# Patient Record
Sex: FEMALE | Race: OTHER | HISPANIC OR LATINO | URBAN - METROPOLITAN AREA
[De-identification: names, ages, dates, MRNs, and addresses within clinical notes are randomized per-mention and may not be internally consistent; named-entity substitution may affect disease eponyms.]

---

## 2018-02-19 ENCOUNTER — EMERGENCY (EMERGENCY)
Facility: HOSPITAL | Age: 48
LOS: 1 days | Discharge: HOME | End: 2018-02-19
Admitting: EMERGENCY MEDICINE
Payer: COMMERCIAL

## 2018-02-19 VITALS
TEMPERATURE: 98 F | HEART RATE: 72 BPM | DIASTOLIC BLOOD PRESSURE: 57 MMHG | OXYGEN SATURATION: 98 % | SYSTOLIC BLOOD PRESSURE: 103 MMHG | RESPIRATION RATE: 18 BRPM | WEIGHT: 151.68 LBS

## 2018-02-19 PROCEDURE — 99284 EMERGENCY DEPT VISIT MOD MDM: CPT

## 2018-02-19 PROCEDURE — 96372 THER/PROPH/DIAG INJ SC/IM: CPT

## 2018-02-19 PROCEDURE — 99284 EMERGENCY DEPT VISIT MOD MDM: CPT | Mod: 25

## 2018-02-19 RX ORDER — KETOROLAC TROMETHAMINE 30 MG/ML
60 SYRINGE (ML) INJECTION ONCE
Qty: 0 | Refills: 0 | Status: DISCONTINUED | OUTPATIENT
Start: 2018-02-19 | End: 2018-02-19

## 2018-02-19 RX ORDER — DIAZEPAM 5 MG
5 TABLET ORAL ONCE
Qty: 0 | Refills: 0 | Status: DISCONTINUED | OUTPATIENT
Start: 2018-02-19 | End: 2018-02-19

## 2018-02-19 RX ORDER — TRAMADOL HYDROCHLORIDE 50 MG/1
1 TABLET ORAL
Qty: 9 | Refills: 0
Start: 2018-02-19 | End: 2018-02-21

## 2018-02-19 RX ORDER — IBUPROFEN 200 MG
1 TABLET ORAL
Qty: 15 | Refills: 0
Start: 2018-02-19 | End: 2018-02-23

## 2018-02-19 RX ORDER — DIAZEPAM 5 MG
1 TABLET ORAL
Qty: 9 | Refills: 0 | OUTPATIENT
Start: 2018-02-19 | End: 2018-02-21

## 2018-02-19 RX ORDER — TRAMADOL HYDROCHLORIDE 50 MG/1
50 TABLET ORAL ONCE
Qty: 0 | Refills: 0 | Status: DISCONTINUED | OUTPATIENT
Start: 2018-02-19 | End: 2018-02-19

## 2018-02-19 RX ADMIN — Medication 5 MILLIGRAM(S): at 13:18

## 2018-02-19 RX ADMIN — TRAMADOL HYDROCHLORIDE 50 MILLIGRAM(S): 50 TABLET ORAL at 13:18

## 2018-02-19 RX ADMIN — Medication 60 MILLIGRAM(S): at 13:18

## 2018-02-19 NOTE — ED ADULT NURSE NOTE - OBJECTIVE STATEMENT
48 y/o female c/o lower left sided back pain radiating down the left leg since 2 weeks ago but got much worse yesterday. pt reports "I was picking up my daughter two weeks ago and I think I hurt it then." denies bowel or bladder incontinence, sob, n/v/d, fever/chills, urinary symptoms, chest pain, numbness/tingling. NAD, VSS, able to ambulate independently. also c/o slight headache.

## 2018-02-19 NOTE — ED PROVIDER NOTE - MEDICAL DECISION MAKING DETAILS
pt w/l lbp radiating down l leg x 2 wks after heavy lifting, no blunt trauma, no signs of cord compression or cauda equina, exam consistent w/sciatica, will tx w/nsaids and muscle relaxants, to f/u w/spine for outpatient mri and further tx, pt understands and agrees w/plan

## 2018-02-19 NOTE — ED ADULT NURSE NOTE - CHPI ED SYMPTOMS NEG
no numbness/no tingling/no anorexia/no constipation/no bowel dysfunction/no motor function loss/no difficulty bearing weight/no fatigue/no neck tenderness/no bladder dysfunction

## 2018-02-19 NOTE — ED PROVIDER NOTE - MUSCULOSKELETAL, MLM
no spinal tend, no rash, no lesions, FROM, + tend over L sciatic notch, + straight leg raise on L, muscle strength 5/5 b/l LE, good resistance b/l, + light touch b/l, normal gait

## 2018-02-19 NOTE — ED ADULT TRIAGE NOTE - CHIEF COMPLAINT QUOTE
Pt CO lower back pain radiating down LLE.  Pt states "I picked up my daughter and that's when the pain started."  Pt denies falls, trauma, dizziness, N/V/D, SOB, Fevers and CP

## 2018-02-19 NOTE — ED PROVIDER NOTE - OBJECTIVE STATEMENT
The pt is a 48 y/o F, who presents to ED L sided back pain x 2 wks. States pain started after picking up her daughter, pain is 10/10, constant and dull, radiates down L leg, has not taken any pain meds today - took ibuprofen once yest. Denies blunt trauma, fall, numbness or tingling to toes, loss of bowel or bladder control, fevers, chills, flank pain

## 2018-02-23 DIAGNOSIS — M54.5 LOW BACK PAIN: ICD-10-CM

## 2018-02-23 DIAGNOSIS — M54.42 LUMBAGO WITH SCIATICA, LEFT SIDE: ICD-10-CM

## 2019-08-10 ENCOUNTER — EMERGENCY (EMERGENCY)
Facility: HOSPITAL | Age: 49
LOS: 1 days | Discharge: HOME | End: 2019-08-10
Admitting: EMERGENCY MEDICINE
Payer: MEDICAID

## 2019-08-10 VITALS
HEART RATE: 75 BPM | SYSTOLIC BLOOD PRESSURE: 102 MMHG | WEIGHT: 151.9 LBS | HEIGHT: 62 IN | DIASTOLIC BLOOD PRESSURE: 64 MMHG | OXYGEN SATURATION: 97 % | TEMPERATURE: 98 F | RESPIRATION RATE: 20 BRPM

## 2019-08-10 DIAGNOSIS — K08.89 OTHER SPECIFIED DISORDERS OF TEETH AND SUPPORTING STRUCTURES: ICD-10-CM

## 2019-08-10 DIAGNOSIS — R68.84 JAW PAIN: ICD-10-CM

## 2019-08-10 PROCEDURE — 99283 EMERGENCY DEPT VISIT LOW MDM: CPT

## 2019-08-10 PROCEDURE — 99284 EMERGENCY DEPT VISIT MOD MDM: CPT

## 2019-08-10 RX ORDER — AMOXICILLIN 250 MG/5ML
500 SUSPENSION, RECONSTITUTED, ORAL (ML) ORAL ONCE
Refills: 0 | Status: COMPLETED | OUTPATIENT
Start: 2019-08-10 | End: 2019-08-10

## 2019-08-10 RX ORDER — AMOXICILLIN 250 MG/5ML
1 SUSPENSION, RECONSTITUTED, ORAL (ML) ORAL
Qty: 30 | Refills: 0
Start: 2019-08-10 | End: 2019-08-19

## 2019-08-10 RX ORDER — IBUPROFEN 200 MG
1 TABLET ORAL
Qty: 30 | Refills: 0
Start: 2019-08-10 | End: 2019-08-19

## 2019-08-10 RX ORDER — OXYCODONE AND ACETAMINOPHEN 5; 325 MG/1; MG/1
1 TABLET ORAL ONCE
Refills: 0 | Status: DISCONTINUED | OUTPATIENT
Start: 2019-08-10 | End: 2019-08-10

## 2019-08-10 RX ADMIN — OXYCODONE AND ACETAMINOPHEN 1 TABLET(S): 5; 325 TABLET ORAL at 19:40

## 2019-08-10 RX ADMIN — Medication 500 MILLIGRAM(S): at 19:40

## 2019-08-10 NOTE — ED PROVIDER NOTE - PHYSICAL EXAMINATION
Tenderness to tooth number 12. No swelling. No purulent discharge. Tooth is intact. Tenderness to tooth number 12. No purulent discharge. Tooth is intact. no gum swelling or facial swelling.  no erythema.

## 2019-08-10 NOTE — ED ADULT NURSE NOTE - OBJECTIVE STATEMENT
Pt presents to ED with c/o L side jaw pain x3 days, states she has a tooth infection, no fever/chills or facial swelling noted.

## 2019-08-10 NOTE — ED PROVIDER NOTE - OBJECTIVE STATEMENT
49 y/o F with no significant PMHx presents to the ED with complaints of L upper tooth pain x 3 days. Patient reports pain with brushing teeth and eating. Patient states she last had unremarkable dental visit one month ago. Scheduled for follow up appointment in 4 days. Has been taking Motrin for pain, most recently at 08:00 this morning. Denies trauma, fever, swelling, drainage or any other acute complaints.

## 2019-08-10 NOTE — ED PROVIDER NOTE - CLINICAL SUMMARY MEDICAL DECISION MAKING FREE TEXT BOX
right upper toothache. pt well appearing. a febrile. no evidence of abscess on exam. will tx with pain meds, amoxicillin and f/u with dental

## 2019-08-10 NOTE — ED ADULT TRIAGE NOTE - CHIEF COMPLAINT QUOTE
Patient complaining of left sided jaw pain for three days.  Patient denies any difficulty breathing, N/V, fevers or any other complaints at this time.

## 2019-08-10 NOTE — ED PROVIDER NOTE - NSFOLLOWUPINSTRUCTIONS_ED_ALL_ED_FT
Follow up with your dentist on 8/12/19          Toothache    WHAT YOU NEED TO KNOW:    A toothache is pain that is caused by irritation of the nerves in the center of your tooth. The irritation may be caused by several problems, such as a cavity, an infection, a cracked tooth, or gum disease. Tooth Anatomy         DISCHARGE INSTRUCTIONS:    Return to the emergency department if:     You have trouble breathing or swallowing.       You have swelling in your face or neck.     Contact your dentist if:     You have a fever and chills.       You have trouble opening or closing your mouth.       You have swelling around your tooth.       You have questions or concerns about your condition or care.    Medicines: You may need any of the following:     NSAIDs, such as ibuprofen, help decrease swelling, pain, and fever. This medicine is available with or without a doctor's order. NSAIDs can cause stomach bleeding or kidney problems in certain people. If you take blood thinner medicine, always ask if NSAIDs are safe for you. Always read the medicine label and follow directions. Do not give these medicines to children under 6 months of age without direction from your child's healthcare provider.      Acetaminophen decreases pain and fever. It is available without a doctor's order. Ask how much to take and how often to take it. Follow directions. Acetaminophen can cause liver damage if not taken correctly.      Prescription pain medicine may be given. Ask your healthcare provider how to take this medicine safely. Some prescription pain medicines contain acetaminophen. Do not take other medicines that contain acetaminophen without talking to your healthcare provider. Too much acetaminophen may cause liver damage. Prescription pain medicine may cause constipation. Ask your healthcare provider how to prevent or treat constipation.       Antibiotics help treat or prevent a bacterial infection.       Take your medicine as directed. Contact your healthcare provider if you think your medicine is not helping or if you have side effects. Tell him of her if you are allergic to any medicine. Keep a list of the medicines, vitamins, and herbs you take. Include the amounts, and when and why you take them. Bring the list or the pill bottles to follow-up visits. Carry your medicine list with you in case of an emergency.    Self-care:     Rinse your mouth with warm salt water 4 times a day or as directed.       Eat soft foods to help relieve pain caused by chewing.       Apply ice on your jaw or cheek for 15 to 20 minutes every hour or as directed. Use an ice pack, or put crushed ice in a plastic bag. Cover it with a towel before you apply it. Ice helps prevent tissue damage and decreases swelling and pain.    Help prevent a toothache:     Brush your teeth at least 2 times a day.      Use dental floss to clean between your teeth at least 1 time a day.      See your dentist regularly every 6 months for dental cleanings and oral exams.    Follow up with your dentist as directed: You may be referred to a dental surgeon. Write down your questions so you remember to ask them during your visits.        © Copyright [employer] 2019 All illustrations and images included in CareNotes are the copyrighted property of [employer] or [employer].      back to top                      © Copyright [employer] 2019

## 2019-12-10 NOTE — ED ADULT NURSE NOTE - NSFALLRSKHARMRISK_ED_ALL_ED
Frontal HA, high blood pressure. Sent in by PMD for further assessment    Was evaluated  12/8 in the ED     I performed a brief evaluation, including history and physical, of the patient here in triage and I have determined that pt will need further treatment and evaluation from the main side ER physician. I have placed initial orders to help in expediting patients care.      December 10, 2019 at 4:53 PM - Frankey Berry, PA
no

## 2020-01-21 ENCOUNTER — EMERGENCY (EMERGENCY)
Facility: HOSPITAL | Age: 50
LOS: 1 days | Discharge: HOME | End: 2020-01-21
Attending: EMERGENCY MEDICINE | Admitting: EMERGENCY MEDICINE
Payer: MEDICAID

## 2020-01-21 VITALS
TEMPERATURE: 98 F | OXYGEN SATURATION: 99 % | DIASTOLIC BLOOD PRESSURE: 57 MMHG | HEART RATE: 67 BPM | WEIGHT: 147.05 LBS | RESPIRATION RATE: 20 BRPM | SYSTOLIC BLOOD PRESSURE: 123 MMHG

## 2020-01-21 LAB
ALBUMIN SERPL ELPH-MCNC: 3.9 G/DL — SIGNIFICANT CHANGE UP (ref 3.3–5)
ALP SERPL-CCNC: 167 U/L — HIGH (ref 40–120)
ALT FLD-CCNC: 173 U/L — HIGH (ref 10–45)
ANION GAP SERPL CALC-SCNC: 11 MMOL/L — SIGNIFICANT CHANGE UP (ref 5–17)
AST SERPL-CCNC: 222 U/L — HIGH (ref 10–40)
BASOPHILS # BLD AUTO: 0.04 K/UL — SIGNIFICANT CHANGE UP (ref 0–0.2)
BASOPHILS NFR BLD AUTO: 0.5 % — SIGNIFICANT CHANGE UP (ref 0–2)
BILIRUB SERPL-MCNC: 1 MG/DL — SIGNIFICANT CHANGE UP (ref 0.2–1.2)
BUN SERPL-MCNC: 9 MG/DL — SIGNIFICANT CHANGE UP (ref 7–23)
CALCIUM SERPL-MCNC: 8.2 MG/DL — LOW (ref 8.4–10.5)
CHLORIDE SERPL-SCNC: 104 MMOL/L — SIGNIFICANT CHANGE UP (ref 96–108)
CO2 SERPL-SCNC: 24 MMOL/L — SIGNIFICANT CHANGE UP (ref 22–31)
CREAT SERPL-MCNC: 0.62 MG/DL — SIGNIFICANT CHANGE UP (ref 0.5–1.3)
EOSINOPHIL # BLD AUTO: 0.08 K/UL — SIGNIFICANT CHANGE UP (ref 0–0.5)
EOSINOPHIL NFR BLD AUTO: 0.9 % — SIGNIFICANT CHANGE UP (ref 0–6)
GLUCOSE SERPL-MCNC: 112 MG/DL — HIGH (ref 70–99)
HCG SERPL-ACNC: <0 MIU/ML — SIGNIFICANT CHANGE UP
HCT VFR BLD CALC: 38.3 % — SIGNIFICANT CHANGE UP (ref 34.5–45)
HGB BLD-MCNC: 12 G/DL — SIGNIFICANT CHANGE UP (ref 11.5–15.5)
IMM GRANULOCYTES NFR BLD AUTO: 0.2 % — SIGNIFICANT CHANGE UP (ref 0–1.5)
LACTATE SERPL-SCNC: 0.8 MMOL/L — SIGNIFICANT CHANGE UP (ref 0.5–2)
LIDOCAIN IGE QN: 17 U/L — SIGNIFICANT CHANGE UP (ref 7–60)
LYMPHOCYTES # BLD AUTO: 1.2 K/UL — SIGNIFICANT CHANGE UP (ref 1–3.3)
LYMPHOCYTES # BLD AUTO: 13.8 % — SIGNIFICANT CHANGE UP (ref 13–44)
MCHC RBC-ENTMCNC: 28.8 PG — SIGNIFICANT CHANGE UP (ref 27–34)
MCHC RBC-ENTMCNC: 31.3 GM/DL — LOW (ref 32–36)
MCV RBC AUTO: 91.8 FL — SIGNIFICANT CHANGE UP (ref 80–100)
MONOCYTES # BLD AUTO: 0.73 K/UL — SIGNIFICANT CHANGE UP (ref 0–0.9)
MONOCYTES NFR BLD AUTO: 8.4 % — SIGNIFICANT CHANGE UP (ref 2–14)
NEUTROPHILS # BLD AUTO: 6.63 K/UL — SIGNIFICANT CHANGE UP (ref 1.8–7.4)
NEUTROPHILS NFR BLD AUTO: 76.2 % — SIGNIFICANT CHANGE UP (ref 43–77)
NRBC # BLD: 0 /100 WBCS — SIGNIFICANT CHANGE UP (ref 0–0)
PLATELET # BLD AUTO: 277 K/UL — SIGNIFICANT CHANGE UP (ref 150–400)
POTASSIUM SERPL-MCNC: 3.9 MMOL/L — SIGNIFICANT CHANGE UP (ref 3.5–5.3)
POTASSIUM SERPL-SCNC: 3.9 MMOL/L — SIGNIFICANT CHANGE UP (ref 3.5–5.3)
PROT SERPL-MCNC: 7.2 G/DL — SIGNIFICANT CHANGE UP (ref 6–8.3)
RBC # BLD: 4.17 M/UL — SIGNIFICANT CHANGE UP (ref 3.8–5.2)
RBC # FLD: 12.5 % — SIGNIFICANT CHANGE UP (ref 10.3–14.5)
SODIUM SERPL-SCNC: 139 MMOL/L — SIGNIFICANT CHANGE UP (ref 135–145)
WBC # BLD: 8.7 K/UL — SIGNIFICANT CHANGE UP (ref 3.8–10.5)
WBC # FLD AUTO: 8.7 K/UL — SIGNIFICANT CHANGE UP (ref 3.8–10.5)

## 2020-01-21 PROCEDURE — 93010 ELECTROCARDIOGRAM REPORT: CPT

## 2020-01-21 PROCEDURE — 99285 EMERGENCY DEPT VISIT HI MDM: CPT

## 2020-01-21 RX ORDER — MORPHINE SULFATE 50 MG/1
4 CAPSULE, EXTENDED RELEASE ORAL ONCE
Refills: 0 | Status: DISCONTINUED | OUTPATIENT
Start: 2020-01-21 | End: 2020-01-21

## 2020-01-21 RX ORDER — SODIUM CHLORIDE 9 MG/ML
1000 INJECTION INTRAMUSCULAR; INTRAVENOUS; SUBCUTANEOUS ONCE
Refills: 0 | Status: COMPLETED | OUTPATIENT
Start: 2020-01-21 | End: 2020-01-21

## 2020-01-21 RX ORDER — LIDOCAINE 4 G/100G
5 CREAM TOPICAL ONCE
Refills: 0 | Status: COMPLETED | OUTPATIENT
Start: 2020-01-21 | End: 2020-01-21

## 2020-01-21 RX ADMIN — SODIUM CHLORIDE 1000 MILLILITER(S): 9 INJECTION INTRAMUSCULAR; INTRAVENOUS; SUBCUTANEOUS at 23:43

## 2020-01-21 RX ADMIN — MORPHINE SULFATE 4 MILLIGRAM(S): 50 CAPSULE, EXTENDED RELEASE ORAL at 23:44

## 2020-01-21 RX ADMIN — Medication 30 MILLILITER(S): at 23:44

## 2020-01-21 RX ADMIN — LIDOCAINE 5 MILLILITER(S): 4 CREAM TOPICAL at 23:44

## 2020-01-21 NOTE — ED ADULT NURSE NOTE - OBJECTIVE STATEMENT
PT came to ED complaining of bilateral abd pain starting this morning. Pt went to [facility], had CT which, per pt, was "clear".  Pt states her pain returned and she came to [facility] for evaluation. Pt reports nausea, no vomiting, denies dizziness, diarrhea, melena, fever, chills.  PT is alert and oriented x3.

## 2020-01-21 NOTE — ED ADULT TRIAGE NOTE - NSWEIGHTCALCTOOLDRUG_GEN_A_CORE
** Ronnell aware of new consult.  Please see ronnell consult that was completed on 1/13 for full assessment.    D:  Per record review, pt is on comfort cares at this time.  Discharge recommendation is for pt do discharge with hospice care services.  Referral was sent to [facility] who declined the pt due to her internal pain pump.    I:  Ronnell met with pt and her family with palliative care and physician.  Pt is unsure at this time if she wants comfort care versus restorative care.  She is concerned about making the wrong decision.  Pt's family expressed their thoughts and concerns and told the pt that they think she would do better with comfort cares compared to restorative options due to potential side effects.  Pt's discharge options are limited at this time due to lack of finances to pay for facilities and finding a facility that will accept the pt with her pump.  All discharge options were discussed with the pt and her family.  Pt requested that she be able to speak with her family alone about this decision.  Sw provided the family with a list of LTC facilities as well as a LTC-MA jessy if it is needed.    A/P:  Sw will continue with discharge planning and will be available as needed until discharge.        used

## 2020-01-21 NOTE — ED ADULT NURSE NOTE - CHPI ED NUR SYMPTOMS NEG
no dysuria/no fever/no hematuria/no chills/no vomiting/no burning urination/no diarrhea/no abdominal distension/no blood in stool

## 2020-01-22 LAB
APPEARANCE UR: CLEAR — SIGNIFICANT CHANGE UP
BACTERIA # UR AUTO: PRESENT /HPF
BILIRUB UR-MCNC: ABNORMAL
COLOR SPEC: YELLOW — SIGNIFICANT CHANGE UP
DIFF PNL FLD: NEGATIVE — SIGNIFICANT CHANGE UP
EPI CELLS # UR: SIGNIFICANT CHANGE UP /HPF (ref 0–5)
GLUCOSE UR QL: NEGATIVE — SIGNIFICANT CHANGE UP
KETONES UR-MCNC: >=80 MG/DL
LEUKOCYTE ESTERASE UR-ACNC: NEGATIVE — SIGNIFICANT CHANGE UP
NITRITE UR-MCNC: NEGATIVE — SIGNIFICANT CHANGE UP
PH UR: 7 — SIGNIFICANT CHANGE UP (ref 5–8)
PROT UR-MCNC: ABNORMAL MG/DL
RBC CASTS # UR COMP ASSIST: < 5 /HPF — SIGNIFICANT CHANGE UP
SP GR SPEC: 1.02 — SIGNIFICANT CHANGE UP (ref 1–1.03)
UROBILINOGEN FLD QL: 2 E.U./DL
WBC UR QL: < 5 /HPF — SIGNIFICANT CHANGE UP

## 2020-01-22 PROCEDURE — 81001 URINALYSIS AUTO W/SCOPE: CPT

## 2020-01-22 PROCEDURE — 93005 ELECTROCARDIOGRAM TRACING: CPT

## 2020-01-22 PROCEDURE — 96374 THER/PROPH/DIAG INJ IV PUSH: CPT

## 2020-01-22 PROCEDURE — 83690 ASSAY OF LIPASE: CPT

## 2020-01-22 PROCEDURE — 76705 ECHO EXAM OF ABDOMEN: CPT | Mod: 26

## 2020-01-22 PROCEDURE — 36415 COLL VENOUS BLD VENIPUNCTURE: CPT

## 2020-01-22 PROCEDURE — 99284 EMERGENCY DEPT VISIT MOD MDM: CPT | Mod: 25

## 2020-01-22 PROCEDURE — 80053 COMPREHEN METABOLIC PANEL: CPT

## 2020-01-22 PROCEDURE — 96361 HYDRATE IV INFUSION ADD-ON: CPT

## 2020-01-22 PROCEDURE — 84702 CHORIONIC GONADOTROPIN TEST: CPT

## 2020-01-22 PROCEDURE — 76705 ECHO EXAM OF ABDOMEN: CPT

## 2020-01-22 PROCEDURE — 85025 COMPLETE CBC W/AUTO DIFF WBC: CPT

## 2020-01-22 PROCEDURE — 83605 ASSAY OF LACTIC ACID: CPT

## 2020-01-22 RX ORDER — TRAMADOL HYDROCHLORIDE 50 MG/1
25 TABLET ORAL ONCE
Refills: 0 | Status: DISCONTINUED | OUTPATIENT
Start: 2020-01-22 | End: 2020-01-22

## 2020-01-22 RX ORDER — TRAMADOL HYDROCHLORIDE 50 MG/1
1 TABLET ORAL
Qty: 12 | Refills: 0
Start: 2020-01-22 | End: 2020-01-25

## 2020-01-22 RX ADMIN — SODIUM CHLORIDE 1000 MILLILITER(S): 9 INJECTION INTRAMUSCULAR; INTRAVENOUS; SUBCUTANEOUS at 01:00

## 2020-01-22 RX ADMIN — TRAMADOL HYDROCHLORIDE 25 MILLIGRAM(S): 50 TABLET ORAL at 02:58

## 2020-01-22 RX ADMIN — TRAMADOL HYDROCHLORIDE 25 MILLIGRAM(S): 50 TABLET ORAL at 02:59

## 2020-01-22 RX ADMIN — MORPHINE SULFATE 4 MILLIGRAM(S): 50 CAPSULE, EXTENDED RELEASE ORAL at 00:00

## 2020-01-22 NOTE — ED PROVIDER NOTE - CLINICAL SUMMARY MEDICAL DECISION MAKING FREE TEXT BOX
abd pain- suprapubic ttp- but b9 abd- with neg CT earlier today- will US GB- recheck labs, serial abd exams abd pain- suprapubic ttp- but b9 abd- with neg CT earlier today- will US GB- recheck labs, serial abd exams  US stones- exam- pain is suprapubic- RUQ neg- d/c home- pain meds prn- si/sx to return explained return in 24 hrs if pain not resolved

## 2020-01-22 NOTE — ED PROVIDER NOTE - OBJECTIVE STATEMENT
49 F co abd pain- 1 day of constant sharp abd pain- started after drinking coffee- went to [facility]- had full schmitz- inc CTa chest/abd/pelvis- neg for acute pathology- labs neg- was given morphine and dc'ed on protonix- presents w persistent sharp pain  feels similar to prior kidney stones- no gu sx  no f/c mild nausea- no vomiting  nml BM this am  no exac/allev factors  moderate severity

## 2020-01-22 NOTE — ED PROVIDER NOTE - NSFOLLOWUPINSTRUCTIONS_ED_ALL_ED_FT
Dolor abdominal en adultos  Abdominal Pain, Adult  El dolor abdominal puede tener muchas causas. A menudo, no es grave y mejora sin tratamiento o con tratamiento en la casa. Sin embargo, a veces el dolor abdominal es intenso. El médico revisará phoebe antecedentes médicos y le hará un examen físico para tratar de determinar la causa del dolor abdominal.  Siga estas instrucciones en bueno casa:  [place] los medicamentos de venta atul y los recetados solamente jonathan se lo haya indicado el médico. No tome un laxante a menos que se lo haya indicado el médico.Irma suficiente líquido para mantener la orina sandie o de color amarillo pálido.Controle bueno afección para oskar si hay cambios.Concurra a todas las visitas de control jonathan se lo haya indicado el médico. [place] es importante.Comuníquese con un médico si:  El dolor abdominal cambia o empeora.No tiene apetito o baja de peso sin proponérselo.Está estreñido o tiene diarrea nahed más de 2 o 3 días.Tiene dolor cuando orina o defeca.El dolor abdominal lo despierta de noche.El dolor empeora con las comidas, después de comer o con determinados alimentos.Tiene vómitos y no puede retener nada.Tiene fiebre.Solicite ayuda de inmediato si:  El dolor no desaparece tan pronto jonathan el médico le dijo que era esperable.No puede detener los vómitos.El dolor se siente solo en zonas del abdomen, jonathan el lado derecho o la parte inferior izquierda del abdomen.Las heces son sanguinolentas o de color belen, o de aspecto alquitranado.Tiene dolor intenso, cólicos, o meteorismo en el abdomen.Tiene signos de deshidratación, por ejemplo:  Orina oscura, muy escasa o falta de orina.Labios agrietados.[place].Ojos hundidos.Somnolencia.Debilidad.Esta información no tiene jonathan fin reemplazar el consejo del médico. Asegúrese de hacerle al médico cualquier pregunta que tenga.    Document Released: 12/18/2006 Document Revised: 12/07/2017 Document Reviewed: 05/31/2017  Elsevier Interactive Patient Education © 2019 [employer] Inc.

## 2020-01-22 NOTE — ED PROVIDER NOTE - PATIENT PORTAL LINK FT
You can access the FollowMyHealth Patient Portal offered by [facility] by registering at the following website: [email or web address]. By joining [facility] [employer]’s FollowMyHealth portal, you will also be able to view your health information using other applications (apps) compatible with our system.

## 2020-01-26 DIAGNOSIS — R11.0 NAUSEA: ICD-10-CM

## 2020-01-26 DIAGNOSIS — R10.9 UNSPECIFIED ABDOMINAL PAIN: ICD-10-CM

## 2020-01-26 DIAGNOSIS — R10.11 RIGHT UPPER QUADRANT PAIN: ICD-10-CM

## 2020-07-27 ENCOUNTER — INPATIENT (INPATIENT)
Facility: HOSPITAL | Age: 50
LOS: 2 days | Discharge: HOME | DRG: 410 | End: 2020-07-30
Attending: SURGERY | Admitting: SURGERY
Payer: MEDICAID

## 2020-07-27 VITALS
RESPIRATION RATE: 18 BRPM | HEIGHT: 62 IN | DIASTOLIC BLOOD PRESSURE: 61 MMHG | SYSTOLIC BLOOD PRESSURE: 108 MMHG | HEART RATE: 72 BPM | WEIGHT: 151.9 LBS | TEMPERATURE: 98 F | OXYGEN SATURATION: 99 %

## 2020-07-27 DIAGNOSIS — Z90.49 ACQUIRED ABSENCE OF OTHER SPECIFIED PARTS OF DIGESTIVE TRACT: Chronic | ICD-10-CM

## 2020-07-27 LAB
ALBUMIN SERPL ELPH-MCNC: 4.2 G/DL — SIGNIFICANT CHANGE UP (ref 3.3–5)
ALP SERPL-CCNC: 136 U/L — HIGH (ref 40–120)
ALT FLD-CCNC: 38 U/L — SIGNIFICANT CHANGE UP (ref 10–45)
ANION GAP SERPL CALC-SCNC: 8 MMOL/L — SIGNIFICANT CHANGE UP (ref 5–17)
APPEARANCE UR: CLEAR — SIGNIFICANT CHANGE UP
APTT BLD: 28.6 SEC — SIGNIFICANT CHANGE UP (ref 27.5–35.5)
AST SERPL-CCNC: 68 U/L — HIGH (ref 10–40)
BASOPHILS # BLD AUTO: 0.03 K/UL — SIGNIFICANT CHANGE UP (ref 0–0.2)
BASOPHILS NFR BLD AUTO: 0.4 % — SIGNIFICANT CHANGE UP (ref 0–2)
BILIRUB SERPL-MCNC: 0.9 MG/DL — SIGNIFICANT CHANGE UP (ref 0.2–1.2)
BILIRUB UR-MCNC: ABNORMAL
BUN SERPL-MCNC: 10 MG/DL — SIGNIFICANT CHANGE UP (ref 7–23)
CALCIUM SERPL-MCNC: 8.7 MG/DL — SIGNIFICANT CHANGE UP (ref 8.4–10.5)
CHLORIDE SERPL-SCNC: 102 MMOL/L — SIGNIFICANT CHANGE UP (ref 96–108)
CO2 SERPL-SCNC: 28 MMOL/L — SIGNIFICANT CHANGE UP (ref 22–31)
COLOR SPEC: YELLOW — SIGNIFICANT CHANGE UP
CREAT SERPL-MCNC: 0.67 MG/DL — SIGNIFICANT CHANGE UP (ref 0.5–1.3)
DIFF PNL FLD: ABNORMAL
EOSINOPHIL # BLD AUTO: 0.05 K/UL — SIGNIFICANT CHANGE UP (ref 0–0.5)
EOSINOPHIL NFR BLD AUTO: 0.7 % — SIGNIFICANT CHANGE UP (ref 0–6)
GLUCOSE SERPL-MCNC: 104 MG/DL — HIGH (ref 70–99)
GLUCOSE UR QL: NEGATIVE — SIGNIFICANT CHANGE UP
HCT VFR BLD CALC: 40.2 % — SIGNIFICANT CHANGE UP (ref 34.5–45)
HGB BLD-MCNC: 12.9 G/DL — SIGNIFICANT CHANGE UP (ref 11.5–15.5)
IMM GRANULOCYTES NFR BLD AUTO: 0.4 % — SIGNIFICANT CHANGE UP (ref 0–1.5)
INR BLD: 1.01 — SIGNIFICANT CHANGE UP (ref 0.88–1.16)
KETONES UR-MCNC: 15 MG/DL
LACTATE SERPL-SCNC: 0.8 MMOL/L — SIGNIFICANT CHANGE UP (ref 0.5–2)
LEUKOCYTE ESTERASE UR-ACNC: NEGATIVE — SIGNIFICANT CHANGE UP
LIDOCAIN IGE QN: 23 U/L — SIGNIFICANT CHANGE UP (ref 7–60)
LYMPHOCYTES # BLD AUTO: 1.12 K/UL — SIGNIFICANT CHANGE UP (ref 1–3.3)
LYMPHOCYTES # BLD AUTO: 15.6 % — SIGNIFICANT CHANGE UP (ref 13–44)
MCHC RBC-ENTMCNC: 30.3 PG — SIGNIFICANT CHANGE UP (ref 27–34)
MCHC RBC-ENTMCNC: 32.1 GM/DL — SIGNIFICANT CHANGE UP (ref 32–36)
MCV RBC AUTO: 94.4 FL — SIGNIFICANT CHANGE UP (ref 80–100)
MONOCYTES # BLD AUTO: 0.63 K/UL — SIGNIFICANT CHANGE UP (ref 0–0.9)
MONOCYTES NFR BLD AUTO: 8.8 % — SIGNIFICANT CHANGE UP (ref 2–14)
NEUTROPHILS # BLD AUTO: 5.34 K/UL — SIGNIFICANT CHANGE UP (ref 1.8–7.4)
NEUTROPHILS NFR BLD AUTO: 74.1 % — SIGNIFICANT CHANGE UP (ref 43–77)
NITRITE UR-MCNC: NEGATIVE — SIGNIFICANT CHANGE UP
NRBC # BLD: 0 /100 WBCS — SIGNIFICANT CHANGE UP (ref 0–0)
PH UR: 6.5 — SIGNIFICANT CHANGE UP (ref 5–8)
PLATELET # BLD AUTO: 230 K/UL — SIGNIFICANT CHANGE UP (ref 150–400)
POTASSIUM SERPL-MCNC: 3.8 MMOL/L — SIGNIFICANT CHANGE UP (ref 3.5–5.3)
POTASSIUM SERPL-SCNC: 3.8 MMOL/L — SIGNIFICANT CHANGE UP (ref 3.5–5.3)
PROT SERPL-MCNC: 7.5 G/DL — SIGNIFICANT CHANGE UP (ref 6–8.3)
PROT UR-MCNC: ABNORMAL MG/DL
PROTHROM AB SERPL-ACNC: 12.1 SEC — SIGNIFICANT CHANGE UP (ref 10.6–13.6)
RBC # BLD: 4.26 M/UL — SIGNIFICANT CHANGE UP (ref 3.8–5.2)
RBC # FLD: 13.3 % — SIGNIFICANT CHANGE UP (ref 10.3–14.5)
SARS-COV-2 RNA SPEC QL NAA+PROBE: SIGNIFICANT CHANGE UP
SODIUM SERPL-SCNC: 138 MMOL/L — SIGNIFICANT CHANGE UP (ref 135–145)
SP GR SPEC: >=1.03 — SIGNIFICANT CHANGE UP (ref 1–1.03)
UROBILINOGEN FLD QL: 2 E.U./DL
WBC # BLD: 7.2 K/UL — SIGNIFICANT CHANGE UP (ref 3.8–10.5)
WBC # FLD AUTO: 7.2 K/UL — SIGNIFICANT CHANGE UP (ref 3.8–10.5)

## 2020-07-27 PROCEDURE — 99285 EMERGENCY DEPT VISIT HI MDM: CPT

## 2020-07-27 PROCEDURE — 76705 ECHO EXAM OF ABDOMEN: CPT | Mod: 26

## 2020-07-27 RX ORDER — SODIUM CHLORIDE 9 MG/ML
1000 INJECTION, SOLUTION INTRAVENOUS
Refills: 0 | Status: DISCONTINUED | OUTPATIENT
Start: 2020-07-27 | End: 2020-07-29

## 2020-07-27 RX ORDER — PANTOPRAZOLE SODIUM 20 MG/1
40 TABLET, DELAYED RELEASE ORAL DAILY
Refills: 0 | Status: DISCONTINUED | OUTPATIENT
Start: 2020-07-27 | End: 2020-07-29

## 2020-07-27 RX ORDER — ONDANSETRON 8 MG/1
4 TABLET, FILM COATED ORAL ONCE
Refills: 0 | Status: COMPLETED | OUTPATIENT
Start: 2020-07-27 | End: 2020-07-27

## 2020-07-27 RX ORDER — HEPARIN SODIUM 5000 [USP'U]/ML
5000 INJECTION INTRAVENOUS; SUBCUTANEOUS EVERY 8 HOURS
Refills: 0 | Status: DISCONTINUED | OUTPATIENT
Start: 2020-07-27 | End: 2020-07-29

## 2020-07-27 RX ORDER — FAMOTIDINE 10 MG/ML
20 INJECTION INTRAVENOUS ONCE
Refills: 0 | Status: COMPLETED | OUTPATIENT
Start: 2020-07-27 | End: 2020-07-27

## 2020-07-27 RX ORDER — METRONIDAZOLE 500 MG
500 TABLET ORAL ONCE
Refills: 0 | Status: COMPLETED | OUTPATIENT
Start: 2020-07-27 | End: 2020-07-27

## 2020-07-27 RX ORDER — CEFTRIAXONE 500 MG/1
1000 INJECTION, POWDER, FOR SOLUTION INTRAMUSCULAR; INTRAVENOUS ONCE
Refills: 0 | Status: COMPLETED | OUTPATIENT
Start: 2020-07-27 | End: 2020-07-27

## 2020-07-27 RX ORDER — ONDANSETRON 8 MG/1
4 TABLET, FILM COATED ORAL EVERY 6 HOURS
Refills: 0 | Status: DISCONTINUED | OUTPATIENT
Start: 2020-07-27 | End: 2020-07-29

## 2020-07-27 RX ORDER — MORPHINE SULFATE 50 MG/1
4 CAPSULE, EXTENDED RELEASE ORAL ONCE
Refills: 0 | Status: DISCONTINUED | OUTPATIENT
Start: 2020-07-27 | End: 2020-07-27

## 2020-07-27 RX ADMIN — FAMOTIDINE 20 MILLIGRAM(S): 10 INJECTION INTRAVENOUS at 16:41

## 2020-07-27 RX ADMIN — MORPHINE SULFATE 4 MILLIGRAM(S): 50 CAPSULE, EXTENDED RELEASE ORAL at 18:03

## 2020-07-27 RX ADMIN — ONDANSETRON 4 MILLIGRAM(S): 8 TABLET, FILM COATED ORAL at 16:41

## 2020-07-27 RX ADMIN — Medication 100 MILLIGRAM(S): at 19:37

## 2020-07-27 RX ADMIN — SODIUM CHLORIDE 110 MILLILITER(S): 9 INJECTION, SOLUTION INTRAVENOUS at 22:56

## 2020-07-27 RX ADMIN — CEFTRIAXONE 100 MILLIGRAM(S): 500 INJECTION, POWDER, FOR SOLUTION INTRAMUSCULAR; INTRAVENOUS at 18:20

## 2020-07-27 NOTE — ED PROVIDER NOTE - ATTENDING CONTRIBUTION TO CARE
49F PMH cholelithiasis p/w 1-2d of RUQ/epigastric pain, radiating to back. No other systemic symptoms. Vitals wnl, exam as above.  US ~7 mos ago w/ cholelithiasis, mild transaminitis at that time.   ddx: cholecystitis/cholelithiasis vs. GERD/gastritis/PUD vs. pancreatitis.   IVF, symptom control, labs, RUQ US, reassess.

## 2020-07-27 NOTE — ED PROVIDER NOTE - CLINICAL SUMMARY MEDICAL DECISION MAKING FREE TEXT BOX
Patient admitted for further surgical intervention. Labs wnl and pain better control. Sonogram + for ac cholecystitis

## 2020-07-27 NOTE — ED ADULT NURSE NOTE - NS ED NOTE ABUSE RESPONSE YN
Yes Birth Control Pills Pregnancy And Lactation Text: This medication should be avoided if pregnant and for the first 30 days post-partum.

## 2020-07-27 NOTE — ED ADULT NURSE NOTE - CHPI ED NUR SYMPTOMS NEG
no hematuria/no diarrhea/no burning urination/no blood in stool/no fever/no abdominal distension/no chills

## 2020-07-27 NOTE — ED PROVIDER NOTE - OBJECTIVE STATEMENT
47 y/o 48 y/o F PMHx gastritis presents to the ED with c/o severe abdominal pain x 2 D w/ associated nausea. Admits to drinking alcohol 2 D ago. Denies vomiting, fever, diarrhea, urinary sx. Also reports being treated for syphilis. Pt had first dose of antibiotics on Tuesday.

## 2020-07-27 NOTE — H&P ADULT - HISTORY OF PRESENT ILLNESS
48 y/o F pmh Gastritis, PSH Appendectomy p/w 1 day of abdominal pain. Patient states pain began yesterday as dull, intermittent, epigastric pain radiating to the back. Pain worsened by meals, no alleviating factors. Associated with nausea and nbnb emesis. Passing flatus and having BMs. Denies fevers, chills, cp, sob, constipation.     In the ED vitals were as follows: 98.4 HR 72 /61 sat 99%. Received 1 dose of Ceftriaxone 1g and Flagyl 500mg.

## 2020-07-27 NOTE — H&P ADULT - NSHPLABSRESULTS_GEN_ALL_CORE
LABS:                        12.9   7.20  )-----------( 230      ( 27 Jul 2020 16:08 )             40.2     07-27    138  |  102  |  10  ----------------------------<  104<H>  3.8   |  28  |  0.67    Ca    8.7      27 Jul 2020 16:09    TPro  7.5  /  Alb  4.2  /  TBili  0.9  /  DBili  x   /  AST  68<H>  /  ALT  38  /  AlkPhos  136<H>  07-27    PT/INR - ( 27 Jul 2020 18:06 )   PT: 12.1 sec;   INR: 1.01          PTT - ( 27 Jul 2020 18:06 )  PTT:28.6 sec  Urinalysis Basic - ( 27 Jul 2020 18:18 )    Color: Yellow / Appearance: Clear / SG: >=1.030 / pH: x  Gluc: x / Ketone: 15 mg/dL  / Bili: Small / Urobili: 2.0 E.U./dL   Blood: x / Protein: Trace mg/dL / Nitrite: NEGATIVE   Leuk Esterase: NEGATIVE / RBC: < 5 /HPF / WBC < 5 /HPF   Sq Epi: x / Non Sq Epi: 0-5 /HPF / Bacteria: Present /HPF        RADIOLOGY & ADDITIONAL STUDIES:  < from: US Abdomen Limited (07.27.20 @ 17:11) >    FINDINGS:    Liver: Normal echogenicity with no visible focal abnormality. Liver size is normal, measuring 12.4 cm in craniocaudal dimension. Hepatopetal flow is noted in the main portal vein.    Intrahepatic ducts: Not dilated.    Common bile duct: Dilated, measuring 0.8 cm near dillon hepatis and 1.3 cm intrapancreatic portion.    Gallbladder: The gallbladder is filled with shadowing gallstones, which limits evaluation. Positive sonographic Tabor's sign. No pericholecystic fluid seen.    Pancreas: Mildly dilated main pancreatic duct, 0.4 cm.    Abdominal aorta: The visualized portions were unremarkable.    Inferior vena cava: The visualized portions were normal in appearance.    Right kidney: Normal echogenicity. Lower pole cyst measuring 1.9 x 1.6 x 1.6 cm. Length of 10.3 cm.    Ascites: None.      IMPRESSION:  1.  Multiple stones filling the gallbladder lumen with positive sonographic Tabor's sign suspicious for acute cholecystitis.  2.  Dilated extrahepatic bile duct 1.3 cm without sonographic evidence of choledocholithiasis. No intrahepatic duct dilatation. MRI/MRCP might be considered for further assessment.          Thank you for the opportunity to participate in the care of this patient.    < end of copied text >

## 2020-07-27 NOTE — H&P ADULT - ATTENDING COMMENTS
Patient seen and examined.  Minimal tenderness in RUQ.  Mild leukocytosis and LFT abnormalities.  USG with gallstones, dilated CBD especially in distal portion.    A/P: Choledocholithiasis vs choledochal cyst  1. Admit, NPO, IVF  2. MRCP  3. Hold off on antibiotics for now.

## 2020-07-27 NOTE — H&P ADULT - NSHPPHYSICALEXAM_GEN_ALL_CORE
ICU Vital Signs Last 24 Hrs  T(C): 36.9 (27 Jul 2020 21:50), Max: 36.9 (27 Jul 2020 15:28)  T(F): 98.5 (27 Jul 2020 21:50), Max: 98.5 (27 Jul 2020 21:50)  HR: 60 (27 Jul 2020 21:50) (60 - 72)  BP: 99/51 (27 Jul 2020 21:50) (96/52 - 108/61)  BP(mean): --  ABP: --  ABP(mean): --  RR: 16 (27 Jul 2020 21:50) (16 - 18)  SpO2: 99% (27 Jul 2020 21:50) (98% - 99%)    Physical Exam  General: NAD, resting comfortably in bed  C/V: NSR  Pulm: Nonlabored breathing, no respiratory distress  Abd: soft, non-distended, mild epigastric tenderness, no rebound or guarding   Extrem: WWP, no edema,  Neuro: A/O x 3, CNs II-XII grossly intact, no focal deficits, normal sensation  Pulses: palpable distal pulses

## 2020-07-27 NOTE — ED PROVIDER NOTE - PROGRESS NOTE DETAILS
[place]: labs notable for mild transaminitis, improved from prior. US w/ multiple gallstones, +sonographic murphys. Pt still in pain and epigastric/RUQ ttp. Surg consulted. Will reassess. [place]: pain now much improved but still in pain. Minimal epigastric ttp, improved from prior. Awaiting surg consult, likely dispo per surgery. pt for admission to surgery, regional under Dr. Blount

## 2020-07-27 NOTE — ED ADULT NURSE NOTE - SCORE
Last annual: 01/09/2018  Last refill: 4/11/19  Requesting refill with 90 day supply.     Please advise.    2

## 2020-07-28 ENCOUNTER — TRANSCRIPTION ENCOUNTER (OUTPATIENT)
Age: 50
End: 2020-07-28

## 2020-07-28 DIAGNOSIS — E83.39 OTHER DISORDERS OF PHOSPHORUS METABOLISM: ICD-10-CM

## 2020-07-28 DIAGNOSIS — K81.0 ACUTE CHOLECYSTITIS: ICD-10-CM

## 2020-07-28 DIAGNOSIS — K29.70 GASTRITIS, UNSPECIFIED, WITHOUT BLEEDING: ICD-10-CM

## 2020-07-28 LAB
ALBUMIN SERPL ELPH-MCNC: 3.8 G/DL — SIGNIFICANT CHANGE UP (ref 3.3–5)
ALP SERPL-CCNC: 170 U/L — HIGH (ref 40–120)
ALT FLD-CCNC: 273 U/L — HIGH (ref 10–45)
ANION GAP SERPL CALC-SCNC: 9 MMOL/L — SIGNIFICANT CHANGE UP (ref 5–17)
AST SERPL-CCNC: 377 U/L — HIGH (ref 10–40)
BILIRUB SERPL-MCNC: 1.2 MG/DL — SIGNIFICANT CHANGE UP (ref 0.2–1.2)
BUN SERPL-MCNC: 9 MG/DL — SIGNIFICANT CHANGE UP (ref 7–23)
CALCIUM SERPL-MCNC: 8.2 MG/DL — LOW (ref 8.4–10.5)
CHLORIDE SERPL-SCNC: 104 MMOL/L — SIGNIFICANT CHANGE UP (ref 96–108)
CO2 SERPL-SCNC: 24 MMOL/L — SIGNIFICANT CHANGE UP (ref 22–31)
CREAT SERPL-MCNC: 0.63 MG/DL — SIGNIFICANT CHANGE UP (ref 0.5–1.3)
GLUCOSE SERPL-MCNC: 111 MG/DL — HIGH (ref 70–99)
HCT VFR BLD CALC: 37.8 % — SIGNIFICANT CHANGE UP (ref 34.5–45)
HGB BLD-MCNC: 12.2 G/DL — SIGNIFICANT CHANGE UP (ref 11.5–15.5)
MAGNESIUM SERPL-MCNC: 2 MG/DL — SIGNIFICANT CHANGE UP (ref 1.6–2.6)
MCHC RBC-ENTMCNC: 30 PG — SIGNIFICANT CHANGE UP (ref 27–34)
MCHC RBC-ENTMCNC: 32.3 GM/DL — SIGNIFICANT CHANGE UP (ref 32–36)
MCV RBC AUTO: 93.1 FL — SIGNIFICANT CHANGE UP (ref 80–100)
NRBC # BLD: 0 /100 WBCS — SIGNIFICANT CHANGE UP (ref 0–0)
PHOSPHATE SERPL-MCNC: 1.6 MG/DL — LOW (ref 2.5–4.5)
PLATELET # BLD AUTO: 202 K/UL — SIGNIFICANT CHANGE UP (ref 150–400)
POTASSIUM SERPL-MCNC: 3.6 MMOL/L — SIGNIFICANT CHANGE UP (ref 3.5–5.3)
POTASSIUM SERPL-SCNC: 3.6 MMOL/L — SIGNIFICANT CHANGE UP (ref 3.5–5.3)
PROT SERPL-MCNC: 6.3 G/DL — SIGNIFICANT CHANGE UP (ref 6–8.3)
RBC # BLD: 4.06 M/UL — SIGNIFICANT CHANGE UP (ref 3.8–5.2)
RBC # FLD: 13.2 % — SIGNIFICANT CHANGE UP (ref 10.3–14.5)
SODIUM SERPL-SCNC: 137 MMOL/L — SIGNIFICANT CHANGE UP (ref 135–145)
WBC # BLD: 5.9 K/UL — SIGNIFICANT CHANGE UP (ref 3.8–10.5)
WBC # FLD AUTO: 5.9 K/UL — SIGNIFICANT CHANGE UP (ref 3.8–10.5)

## 2020-07-28 PROCEDURE — 43274 ERCP DUCT STENT PLACEMENT: CPT | Mod: GC

## 2020-07-28 PROCEDURE — 43262 ENDO CHOLANGIOPANCREATOGRAPH: CPT | Mod: 59

## 2020-07-28 PROCEDURE — 74328 X-RAY BILE DUCT ENDOSCOPY: CPT | Mod: 26,GC

## 2020-07-28 PROCEDURE — 99223 1ST HOSP IP/OBS HIGH 75: CPT | Mod: 25

## 2020-07-28 PROCEDURE — 99223 1ST HOSP IP/OBS HIGH 75: CPT

## 2020-07-28 PROCEDURE — 43264 ERCP REMOVE DUCT CALCULI: CPT | Mod: GC

## 2020-07-28 PROCEDURE — 71045 X-RAY EXAM CHEST 1 VIEW: CPT | Mod: 26

## 2020-07-28 PROCEDURE — 74183 MRI ABD W/O CNTR FLWD CNTR: CPT | Mod: 26

## 2020-07-28 RX ORDER — POTASSIUM PHOSPHATE, MONOBASIC POTASSIUM PHOSPHATE, DIBASIC 236; 224 MG/ML; MG/ML
30 INJECTION, SOLUTION INTRAVENOUS ONCE
Refills: 0 | Status: COMPLETED | OUTPATIENT
Start: 2020-07-28 | End: 2020-07-28

## 2020-07-28 RX ORDER — ACETAMINOPHEN 500 MG
1000 TABLET ORAL ONCE
Refills: 0 | Status: COMPLETED | OUTPATIENT
Start: 2020-07-28 | End: 2020-07-28

## 2020-07-28 RX ORDER — HYDROMORPHONE HYDROCHLORIDE 2 MG/ML
0.25 INJECTION INTRAMUSCULAR; INTRAVENOUS; SUBCUTANEOUS ONCE
Refills: 0 | Status: DISCONTINUED | OUTPATIENT
Start: 2020-07-28 | End: 2020-07-28

## 2020-07-28 RX ORDER — HYDROMORPHONE HYDROCHLORIDE 2 MG/ML
0.5 INJECTION INTRAMUSCULAR; INTRAVENOUS; SUBCUTANEOUS ONCE
Refills: 0 | Status: DISCONTINUED | OUTPATIENT
Start: 2020-07-28 | End: 2020-07-28

## 2020-07-28 RX ORDER — POTASSIUM PHOSPHATE, MONOBASIC POTASSIUM PHOSPHATE, DIBASIC 236; 224 MG/ML; MG/ML
21 INJECTION, SOLUTION INTRAVENOUS ONCE
Refills: 0 | Status: DISCONTINUED | OUTPATIENT
Start: 2020-07-28 | End: 2020-07-28

## 2020-07-28 RX ADMIN — HYDROMORPHONE HYDROCHLORIDE 0.25 MILLIGRAM(S): 2 INJECTION INTRAMUSCULAR; INTRAVENOUS; SUBCUTANEOUS at 05:40

## 2020-07-28 RX ADMIN — POTASSIUM PHOSPHATE, MONOBASIC POTASSIUM PHOSPHATE, DIBASIC 83.33 MILLIMOLE(S): 236; 224 INJECTION, SOLUTION INTRAVENOUS at 10:15

## 2020-07-28 RX ADMIN — HEPARIN SODIUM 5000 UNIT(S): 5000 INJECTION INTRAVENOUS; SUBCUTANEOUS at 05:28

## 2020-07-28 RX ADMIN — HYDROMORPHONE HYDROCHLORIDE 0.25 MILLIGRAM(S): 2 INJECTION INTRAMUSCULAR; INTRAVENOUS; SUBCUTANEOUS at 08:58

## 2020-07-28 RX ADMIN — Medication 400 MILLIGRAM(S): at 05:07

## 2020-07-28 RX ADMIN — PANTOPRAZOLE SODIUM 40 MILLIGRAM(S): 20 TABLET, DELAYED RELEASE ORAL at 13:36

## 2020-07-28 RX ADMIN — HYDROMORPHONE HYDROCHLORIDE 0.25 MILLIGRAM(S): 2 INJECTION INTRAMUSCULAR; INTRAVENOUS; SUBCUTANEOUS at 05:25

## 2020-07-28 RX ADMIN — ONDANSETRON 4 MILLIGRAM(S): 8 TABLET, FILM COATED ORAL at 09:02

## 2020-07-28 RX ADMIN — HEPARIN SODIUM 5000 UNIT(S): 5000 INJECTION INTRAVENOUS; SUBCUTANEOUS at 21:03

## 2020-07-28 RX ADMIN — HYDROMORPHONE HYDROCHLORIDE 0.25 MILLIGRAM(S): 2 INJECTION INTRAMUSCULAR; INTRAVENOUS; SUBCUTANEOUS at 09:14

## 2020-07-28 RX ADMIN — HEPARIN SODIUM 5000 UNIT(S): 5000 INJECTION INTRAVENOUS; SUBCUTANEOUS at 13:36

## 2020-07-28 RX ADMIN — Medication 1000 MILLIGRAM(S): at 05:40

## 2020-07-28 NOTE — CHART NOTE - NSCHARTNOTEFT_GEN_A_CORE
Patient went for ERCP today. Had gone to MRCP prior to EUS. Images reviewed with filling defect seen in the distal CBD. ERCP with sphincterotomy, removal of common bile duct stone, placement of plastic double pigtail stent.     RECOMMENDATIONS  Clear liquid diet tonight  Continue aggressive fluid resuscitation with LR  Trend LTs  Proceed to cholecystectomy per surgical recommendations  Repeat ERCP in 6-8 weeks for stent removal

## 2020-07-28 NOTE — CONSULT NOTE ADULT - ATTENDING COMMENTS
Pt with epigastric pain, elevated LFTs and stone on MRCP images.    Plan was for ERCP which demonstrated a bile duct stone that was extracted with sphincterotomy and balloon sweep, as well as stent placement.

## 2020-07-28 NOTE — CONSULT NOTE ADULT - ASSESSMENT
49 F pmh Gastritis, PSH Appendectomy p/w 1 day of abdominal pain w/ possible acute cholecystitis vs choledocholithiasis vs stricture

## 2020-07-28 NOTE — CONSULT NOTE ADULT - SUBJECTIVE AND OBJECTIVE BOX
Patient is a 49y old  Female who presents with a chief complaint of Acute Cholecystitis (28 Jul 2020 13:25)      HPI:  48 y/o F pmh Gastritis, PSH Appendectomy p/w 1 day of abdominal pain. Patient states pain began yesterday as dull, intermittent, epigastric pain radiating to the back. Pain worsened by meals, no alleviating factors. Associated with nausea and nbnb emesis. Passing flatus and having BMs. Denies fevers, chills, cp, sob, constipation.     Subjective:  Pt reports continued pain in epigastric region, although pain medication has helped. Reports N, denies vj vomiting ROS is otherwise negative.       Allergies    No Known Allergies    Intolerances    Home meds: none    MEDICATIONS  (STANDING):  heparin   Injectable 5000 Unit(s) SubCutaneous every 8 hours  lactated ringers. 1000 milliLiter(s) (110 mL/Hr) IV Continuous <Continuous>  pantoprazole  Injectable 40 milliGRAM(s) IV Push daily    MEDICATIONS  (PRN):  ondansetron Injectable 4 milliGRAM(s) IV Push every 6 hours PRN Nausea      Drug Dosing Weight  Height (cm): 157.48 (28 Jul 2020 16:04)  Weight (kg): 68.9 (28 Jul 2020 01:41)  BMI (kg/m2): 27.8 (28 Jul 2020 16:04)  BSA (m2): 1.7 (28 Jul 2020 16:04)    PAST MEDICAL & SURGICAL HISTORY:  Gastritis  No pertinent past medical history  S/P appendectomy      FAMILY HISTORY:  no cardiac disease    SOCIAL HISTORY:  no smoking, no EtOH use, no drug use    ADVANCE DIRECTIVES:    Vital Signs Last 24 Hrs  T(C): 36.7 (28 Jul 2020 15:58), Max: 36.9 (27 Jul 2020 21:50)  T(F): 98.1 (28 Jul 2020 15:58), Max: 98.5 (27 Jul 2020 21:50)  HR: 57 (28 Jul 2020 15:58) (55 - 75)  BP: 113/69 (28 Jul 2020 15:58) (93/57 - 115/62)  BP(mean): --  ABP: --  ABP(mean): --  RR: 17 (28 Jul 2020 15:58) (16 - 18)  SpO2: 98% (28 Jul 2020 15:58) (96% - 99%)          I&O's Detail    27 Jul 2020 07:01  -  28 Jul 2020 07:00  --------------------------------------------------------  IN:    lactated ringers.: 880 mL  Total IN: 880 mL    OUT:    Voided: 650 mL  Total OUT: 650 mL    Total NET: 230 mL          PHYSICAL EXAM:      Constitutional: NAD  Eyes: PERRLA  ENMT: MMM  Neck: supple  Back: midline  Respiratory: CTA b/l  Cardiovascular: rrr, s1s2, no m/r/g  Gastrointestinal: TTP in epigastric region, soft, non distended, + BS  Extremities: wwp  Vascular: + 2 pulses DP/TP  Neurological: AAO x 4  Skin: no rash  Lymph Nodes: no LAD  Musculoskeletal: no joint swelling  Psychiatric: normal affect    LABS:  CBC Full  -  ( 28 Jul 2020 07:10 )  WBC Count : 5.90 K/uL  RBC Count : 4.06 M/uL  Hemoglobin : 12.2 g/dL  Hematocrit : 37.8 %  Platelet Count - Automated : 202 K/uL  Mean Cell Volume : 93.1 fl  Mean Cell Hemoglobin : 30.0 pg  Mean Cell Hemoglobin Concentration : 32.3 gm/dL  Auto Neutrophil # : x  Auto Lymphocyte # : x  Auto Monocyte # : x  Auto Eosinophil # : x  Auto Basophil # : x  Auto Neutrophil % : x  Auto Lymphocyte % : x  Auto Monocyte % : x  Auto Eosinophil % : x  Auto Basophil % : x    07-28    137  |  104  |  9   ----------------------------<  111<H>  3.6   |  24  |  0.63    Ca    8.2<L>      28 Jul 2020 06:20  Phos  1.6     07-28  Mg     2.0     07-28    TPro  6.3  /  Alb  3.8  /  TBili  1.2  /  DBili  x   /  AST  377<H>  /  ALT  273<H>  /  AlkPhos  170<H>  07-28    CAPILLARY BLOOD GLUCOSE        PT/INR - ( 27 Jul 2020 18:06 )   PT: 12.1 sec;   INR: 1.01          PTT - ( 27 Jul 2020 18:06 )  PTT:28.6 sec  Urinalysis Basic - ( 27 Jul 2020 18:18 )    Color: Yellow / Appearance: Clear / SG: >=1.030 / pH: x  Gluc: x / Ketone: 15 mg/dL  / Bili: Small / Urobili: 2.0 E.U./dL   Blood: x / Protein: Trace mg/dL / Nitrite: NEGATIVE   Leuk Esterase: NEGATIVE / RBC: < 5 /HPF / WBC < 5 /HPF   Sq Epi: x / Non Sq Epi: 0-5 /HPF / Bacteria: Present /HPF        EKG:    ECHO, US:    RADIOLOGY:  < from: US Abdomen Limited (07.27.20 @ 17:11) >  IMPRESSION:  1.  Multiple stones filling the gallbladder lumen with positive sonographic Tabor's sign suspicious for acute cholecystitis.  2.  Dilated extrahepatic bile duct 1.3 cm without sonographic evidence of choledocholithiasis. No intrahepatic duct dilatation. MRI/MRCP might be considered for further assessment.    < end of copied text >

## 2020-07-28 NOTE — CONSULT NOTE ADULT - SUBJECTIVE AND OBJECTIVE BOX
GASTROENTEROLOGY CONSULT NOTE  HPI:  50 y/o F pmh Gastritis, PSH Appendectomy p/w 1 day of abdominal pain. Patient states pain began yesterday as dull, intermittent, epigastric pain radiating to the back. Pain worsened by meals, no alleviating factors. Associated with nausea and nbnb emesis. Passing flatus and having BMs. Denies fevers, chills, cp, sob, constipation.     In the ED vitals were as follows: 98.4 HR 72 /61 sat 99%. Received 1 dose of Ceftriaxone 1g and Flagyl 500mg. (27 Jul 2020 23:18)    GI consulted for possible choledocolithiasis. Patient seen and examined. Nausea and epigastric abdominal pain has been persistent since admission. No more episodes of emesis. Denies fevers/chills. No BM since admission.     Allergies    No Known Allergies    Intolerances      Home Medications:    MEDICATIONS:  MEDICATIONS  (STANDING):  heparin   Injectable 5000 Unit(s) SubCutaneous every 8 hours  lactated ringers. 1000 milliLiter(s) (110 mL/Hr) IV Continuous <Continuous>  pantoprazole  Injectable 40 milliGRAM(s) IV Push daily    MEDICATIONS  (PRN):  ondansetron Injectable 4 milliGRAM(s) IV Push every 6 hours PRN Nausea    PAST MEDICAL & SURGICAL HISTORY:  Gastritis  No pertinent past medical history  S/P appendectomy    FAMILY HISTORY:    SOCIAL HISTORY:  Tobacco: [ ] Current, [ ] Former, [ ] Never; Pack Years:  Alcohol:  Illicit Drugs:    REVIEW OF SYSTEMS:  CONSTITUTIONAL: No weakness, fevers or chills  HEENT: No visual changes; No vertigo or throat pain   NECK: No pain or stiffness  RESPIRATORY: No cough, wheezing, hemoptysis; No shortness of breath  CARDIOVASCULAR: No chest pain or palpitations  GASTROINTESTINAL: As above.  GENITOURINARY: No dysuria, frequency or hematuria  NEUROLOGICAL: No numbness or weakness  SKIN: No itching, burning, rashes, or lesions   All other 10 review of systems is negative unless indicated above.    Vital Signs Last 24 Hrs  T(C): 36.4 (28 Jul 2020 08:48), Max: 36.9 (27 Jul 2020 15:28)  T(F): 97.5 (28 Jul 2020 08:48), Max: 98.5 (27 Jul 2020 21:50)  HR: 62 (28 Jul 2020 08:48) (55 - 75)  BP: 93/57 (28 Jul 2020 08:48) (93/57 - 115/62)  BP(mean): --  RR: 18 (28 Jul 2020 08:48) (16 - 18)  SpO2: 99% (28 Jul 2020 08:48) (96% - 99%)    07-27 @ 07:01  -  07-28 @ 07:00  --------------------------------------------------------  IN: 880 mL / OUT: 650 mL / NET: 230 mL        PHYSICAL EXAM:    General: Well developed; well nourished; in no acute distress  Eyes: Anicteric sclerae, moist conjunctivae  HENT: Moist mucous membranes  Neck: Trachea midline, supple  Lungs: Normal respiratory effort, no intercostal retractions  Cardiovascular: RRR  Abdomen: Soft, minimally tender in epigastrum; non-distended; Normal bowel sounds; No rebound or guarding  Extremities: Normal range of motion, No clubbing, cyanosis or edema  Neurological: Alert and oriented x3  Skin: Warm and dry. No obvious rash    LABS:                        12.2   5.90  )-----------( 202      ( 28 Jul 2020 07:10 )             37.8     07-28    137  |  104  |  9   ----------------------------<  111<H>  3.6   |  24  |  0.63    Ca    8.2<L>      28 Jul 2020 06:20  Phos  1.6     07-28  Mg     2.0     07-28    TPro  6.3  /  Alb  3.8  /  TBili  1.2  /  DBili  x   /  AST  377<H>  /  ALT  273<H>  /  AlkPhos  170<H>  07-28        PT/INR - ( 27 Jul 2020 18:06 )   PT: 12.1 sec;   INR: 1.01          PTT - ( 27 Jul 2020 18:06 )  PTT:28.6 sec    RADIOLOGY & ADDITIONAL STUDIES:     Reviewed

## 2020-07-28 NOTE — CONSULT NOTE ADULT - ASSESSMENT
49F w/ a PMH significant for gastritis and appendicitis s/p appendectomy who presented to the ED w/ epigastric pain of 1 day duration. Liver chemistries have started to uptrend, and RUQ U/S shows extrahepatic biliary ductal dilation to 1.3 cm and multiple gallstones on the GB. CBD measured 0.8 cm near dillon hepatis and 1.3 cm intrapancreatic portion. +sonographic Tabor sign    #R/o choledocolithiasis  - patient w/ biliary colic, imaging findings concerning for biliary obstruction  - no cholangitis findings on vitals/exam, history, or laboratory w/u  - please keep patient NPO, will try to add on for EUS +/- ERCP for this afternoon      Lakshmi Lu MD  PGY-4, Gastroenterology Fellow  pager: [phone]

## 2020-07-29 ENCOUNTER — RESULT REVIEW (OUTPATIENT)
Age: 50
End: 2020-07-29

## 2020-07-29 LAB
ALBUMIN SERPL ELPH-MCNC: 3.7 G/DL — SIGNIFICANT CHANGE UP (ref 3.3–5)
ALP SERPL-CCNC: 162 U/L — HIGH (ref 40–120)
ALT FLD-CCNC: 207 U/L — HIGH (ref 10–45)
ANION GAP SERPL CALC-SCNC: 9 MMOL/L — SIGNIFICANT CHANGE UP (ref 5–17)
APTT BLD: 35.1 SEC — SIGNIFICANT CHANGE UP (ref 27.5–35.5)
AST SERPL-CCNC: 138 U/L — HIGH (ref 10–40)
BILIRUB SERPL-MCNC: 0.6 MG/DL — SIGNIFICANT CHANGE UP (ref 0.2–1.2)
BUN SERPL-MCNC: 6 MG/DL — LOW (ref 7–23)
CALCIUM SERPL-MCNC: 8.5 MG/DL — SIGNIFICANT CHANGE UP (ref 8.4–10.5)
CHLORIDE SERPL-SCNC: 103 MMOL/L — SIGNIFICANT CHANGE UP (ref 96–108)
CO2 SERPL-SCNC: 26 MMOL/L — SIGNIFICANT CHANGE UP (ref 22–31)
CREAT SERPL-MCNC: 0.7 MG/DL — SIGNIFICANT CHANGE UP (ref 0.5–1.3)
CULTURE RESULTS: SIGNIFICANT CHANGE UP
GLUCOSE SERPL-MCNC: 92 MG/DL — SIGNIFICANT CHANGE UP (ref 70–99)
HCG SERPL-ACNC: <0 MIU/ML — SIGNIFICANT CHANGE UP
HCT VFR BLD CALC: 37.7 % — SIGNIFICANT CHANGE UP (ref 34.5–45)
HGB BLD-MCNC: 12 G/DL — SIGNIFICANT CHANGE UP (ref 11.5–15.5)
INR BLD: 1.06 — SIGNIFICANT CHANGE UP (ref 0.88–1.16)
MAGNESIUM SERPL-MCNC: 1.9 MG/DL — SIGNIFICANT CHANGE UP (ref 1.6–2.6)
MCHC RBC-ENTMCNC: 30.5 PG — SIGNIFICANT CHANGE UP (ref 27–34)
MCHC RBC-ENTMCNC: 31.8 GM/DL — LOW (ref 32–36)
MCV RBC AUTO: 95.7 FL — SIGNIFICANT CHANGE UP (ref 80–100)
NRBC # BLD: 0 /100 WBCS — SIGNIFICANT CHANGE UP (ref 0–0)
PHOSPHATE SERPL-MCNC: 2.4 MG/DL — LOW (ref 2.5–4.5)
PLATELET # BLD AUTO: 196 K/UL — SIGNIFICANT CHANGE UP (ref 150–400)
POTASSIUM SERPL-MCNC: 3.9 MMOL/L — SIGNIFICANT CHANGE UP (ref 3.5–5.3)
POTASSIUM SERPL-SCNC: 3.9 MMOL/L — SIGNIFICANT CHANGE UP (ref 3.5–5.3)
PROT SERPL-MCNC: 6.3 G/DL — SIGNIFICANT CHANGE UP (ref 6–8.3)
PROTHROM AB SERPL-ACNC: 12.7 SEC — SIGNIFICANT CHANGE UP (ref 10.6–13.6)
RBC # BLD: 3.94 M/UL — SIGNIFICANT CHANGE UP (ref 3.8–5.2)
RBC # FLD: 13.1 % — SIGNIFICANT CHANGE UP (ref 10.3–14.5)
SODIUM SERPL-SCNC: 138 MMOL/L — SIGNIFICANT CHANGE UP (ref 135–145)
SPECIMEN SOURCE: SIGNIFICANT CHANGE UP
WBC # BLD: 4.32 K/UL — SIGNIFICANT CHANGE UP (ref 3.8–10.5)
WBC # FLD AUTO: 4.32 K/UL — SIGNIFICANT CHANGE UP (ref 3.8–10.5)

## 2020-07-29 PROCEDURE — 99233 SBSQ HOSP IP/OBS HIGH 50: CPT | Mod: GC

## 2020-07-29 PROCEDURE — 88304 TISSUE EXAM BY PATHOLOGIST: CPT | Mod: 26

## 2020-07-29 RX ORDER — HYDROMORPHONE HYDROCHLORIDE 2 MG/ML
0.5 INJECTION INTRAMUSCULAR; INTRAVENOUS; SUBCUTANEOUS EVERY 4 HOURS
Refills: 0 | Status: DISCONTINUED | OUTPATIENT
Start: 2020-07-29 | End: 2020-07-30

## 2020-07-29 RX ORDER — HYDROMORPHONE HYDROCHLORIDE 2 MG/ML
0.5 INJECTION INTRAMUSCULAR; INTRAVENOUS; SUBCUTANEOUS
Refills: 0 | Status: DISCONTINUED | OUTPATIENT
Start: 2020-07-29 | End: 2020-07-30

## 2020-07-29 RX ORDER — ACETAMINOPHEN 500 MG
1000 TABLET ORAL ONCE
Refills: 0 | Status: COMPLETED | OUTPATIENT
Start: 2020-07-29 | End: 2020-07-29

## 2020-07-29 RX ORDER — ACETAMINOPHEN 500 MG
1000 TABLET ORAL ONCE
Refills: 0 | Status: COMPLETED | OUTPATIENT
Start: 2020-07-30 | End: 2020-07-30

## 2020-07-29 RX ORDER — OXYCODONE HYDROCHLORIDE 5 MG/1
10 TABLET ORAL EVERY 4 HOURS
Refills: 0 | Status: DISCONTINUED | OUTPATIENT
Start: 2020-07-29 | End: 2020-07-30

## 2020-07-29 RX ORDER — MAGNESIUM SULFATE 500 MG/ML
2 VIAL (ML) INJECTION ONCE
Refills: 0 | Status: COMPLETED | OUTPATIENT
Start: 2020-07-29 | End: 2020-07-29

## 2020-07-29 RX ORDER — HYDROMORPHONE HYDROCHLORIDE 2 MG/ML
0.5 INJECTION INTRAMUSCULAR; INTRAVENOUS; SUBCUTANEOUS EVERY 4 HOURS
Refills: 0 | Status: DISCONTINUED | OUTPATIENT
Start: 2020-07-29 | End: 2020-07-29

## 2020-07-29 RX ORDER — POTASSIUM PHOSPHATE, MONOBASIC POTASSIUM PHOSPHATE, DIBASIC 236; 224 MG/ML; MG/ML
15 INJECTION, SOLUTION INTRAVENOUS ONCE
Refills: 0 | Status: DISCONTINUED | OUTPATIENT
Start: 2020-07-29 | End: 2020-07-29

## 2020-07-29 RX ORDER — OXYCODONE HYDROCHLORIDE 5 MG/1
5 TABLET ORAL EVERY 4 HOURS
Refills: 0 | Status: DISCONTINUED | OUTPATIENT
Start: 2020-07-29 | End: 2020-07-30

## 2020-07-29 RX ORDER — SODIUM CHLORIDE 9 MG/ML
1000 INJECTION, SOLUTION INTRAVENOUS
Refills: 0 | Status: DISCONTINUED | OUTPATIENT
Start: 2020-07-29 | End: 2020-07-30

## 2020-07-29 RX ORDER — ONDANSETRON 8 MG/1
4 TABLET, FILM COATED ORAL EVERY 6 HOURS
Refills: 0 | Status: DISCONTINUED | OUTPATIENT
Start: 2020-07-29 | End: 2020-07-30

## 2020-07-29 RX ORDER — HEPARIN SODIUM 5000 [USP'U]/ML
5000 INJECTION INTRAVENOUS; SUBCUTANEOUS EVERY 8 HOURS
Refills: 0 | Status: DISCONTINUED | OUTPATIENT
Start: 2020-07-29 | End: 2020-07-30

## 2020-07-29 RX ORDER — KETOROLAC TROMETHAMINE 30 MG/ML
15 SYRINGE (ML) INJECTION EVERY 6 HOURS
Refills: 0 | Status: DISCONTINUED | OUTPATIENT
Start: 2020-07-29 | End: 2020-07-30

## 2020-07-29 RX ADMIN — OXYCODONE HYDROCHLORIDE 10 MILLIGRAM(S): 5 TABLET ORAL at 18:07

## 2020-07-29 RX ADMIN — HEPARIN SODIUM 5000 UNIT(S): 5000 INJECTION INTRAVENOUS; SUBCUTANEOUS at 21:12

## 2020-07-29 RX ADMIN — Medication 1000 MILLIGRAM(S): at 22:05

## 2020-07-29 RX ADMIN — Medication 1000 MILLIGRAM(S): at 01:14

## 2020-07-29 RX ADMIN — Medication 50 GRAM(S): at 08:35

## 2020-07-29 RX ADMIN — OXYCODONE HYDROCHLORIDE 10 MILLIGRAM(S): 5 TABLET ORAL at 17:39

## 2020-07-29 RX ADMIN — HYDROMORPHONE HYDROCHLORIDE 0.5 MILLIGRAM(S): 2 INJECTION INTRAMUSCULAR; INTRAVENOUS; SUBCUTANEOUS at 11:40

## 2020-07-29 RX ADMIN — Medication 15 MILLIGRAM(S): at 23:16

## 2020-07-29 RX ADMIN — Medication 15 MILLIGRAM(S): at 17:29

## 2020-07-29 RX ADMIN — Medication 400 MILLIGRAM(S): at 21:12

## 2020-07-29 RX ADMIN — HYDROMORPHONE HYDROCHLORIDE 0.5 MILLIGRAM(S): 2 INJECTION INTRAMUSCULAR; INTRAVENOUS; SUBCUTANEOUS at 11:07

## 2020-07-29 RX ADMIN — Medication 400 MILLIGRAM(S): at 00:41

## 2020-07-29 RX ADMIN — ONDANSETRON 4 MILLIGRAM(S): 8 TABLET, FILM COATED ORAL at 21:13

## 2020-07-29 RX ADMIN — Medication 15 MILLIGRAM(S): at 18:07

## 2020-07-29 RX ADMIN — Medication 15 MILLIGRAM(S): at 23:00

## 2020-07-29 RX ADMIN — HEPARIN SODIUM 5000 UNIT(S): 5000 INJECTION INTRAVENOUS; SUBCUTANEOUS at 05:01

## 2020-07-29 RX ADMIN — HYDROMORPHONE HYDROCHLORIDE 0.5 MILLIGRAM(S): 2 INJECTION INTRAMUSCULAR; INTRAVENOUS; SUBCUTANEOUS at 16:09

## 2020-07-29 RX ADMIN — PANTOPRAZOLE SODIUM 40 MILLIGRAM(S): 20 TABLET, DELAYED RELEASE ORAL at 11:07

## 2020-07-29 NOTE — BRIEF OPERATIVE NOTE - OPERATION/FINDINGS
Pt placed in reverse Trendelenburg w/ right side up. Cystic duct and artery dissected free. Critical view of safety obtained. Cystic duct and artery clipped and divided. Peritoneal attachments btw GB & liver bed  w/ electrocautery. Hemostasis achieved. No leakage of bile from cystic duct stump. Fascia at umbilical port site closed w/ 0 Vicryl.

## 2020-07-29 NOTE — PROGRESS NOTE ADULT - PROBLEM SELECTOR PLAN 1
MRCP showing distal CBD filling defect. Patient s/p ERCP with sphincterotomy, stone removal, and 7Fr - 7cm double-pigtail plastic biliary stent placement  -Plan for rosalee today

## 2020-07-29 NOTE — CHART NOTE - NSCHARTNOTEFT_GEN_A_CORE
Admitting Diagnosis:   Patient is a 49y old  Female who presents with a chief complaint of Acute Cholecystitis (29 Jul 2020 07:26)    Consult: Yes [   ]  No [ X  ]    Reason for Initial Nutrition Assessment: LOS    PAST MEDICAL & SURGICAL HISTORY:  Gastritis  No pertinent past medical history  S/P appendectomy    Current Nutrition Order: NPO    PO Intake: Good (%) [   ]  Fair (50-75%) [   ] Poor (<25%) [   ]-N/A    GI Issues: +nausea but no vomiting per pt (ordered for zofran), last BM 7/27 (pt reports regular BM PTA)    Pain: pt endorses epigastric pain as well as a headache    Skin Integrity: Kenny score 20    Labs:   07-29    138  |  103  |  6<L>  ----------------------------<  92  3.9   |  26  |  0.70    Ca    8.5      29 Jul 2020 07:28  Phos  2.4     07-29  Mg     1.9     07-29    TPro  6.3  /  Alb  3.7  /  TBili  0.6  /  DBili  x   /  AST  138<H>  /  ALT  207<H>  /  AlkPhos  162<H>  07-29    Nutritionally Pertinent Lab Values:  7/29: BUN 6, Alk phos 162, , , phos 2.4    Medications:  MEDICATIONS  (STANDING):  heparin   Injectable 5000 Unit(s) SubCutaneous every 8 hours  lactated ringers. 1000 milliLiter(s) (110 mL/Hr) IV Continuous <Continuous>  pantoprazole  Injectable 40 milliGRAM(s) IV Push daily  potassium phosphate IVPB 15 milliMole(s) IV Intermittent once    MEDICATIONS  (PRN):  HYDROmorphone  Injectable 0.5 milliGRAM(s) IV Push every 4 hours PRN Moderate Pain (4 - 6)  ondansetron Injectable 4 milliGRAM(s) IV Push every 6 hours PRN Nausea    Admitted Anthropometrics:  Ht (7/29): 157.5cm, Wt (7/29): 68.9kg, IBW: 50kg+/-10%, %IBW: 137%, BMI: 27.8     Nutrition Focused Physical Exam: Completed [ X on 7/29  ]  Unable to complete [   ]  No significant findings on NFPE; RD to monitor closely     Estimated energy needs:   IBW (50kg) used to calculate energy needs due to pt's current body weight exceeding 120% of IBW.  Needs adjusted for pre-op.    2343-8499 kcal (25-30 kcal/kg IBW)  50-60gm protein (1.0-1.2gm/kg IBW)  Fluid needs per team    Subjective:     Nutrition Diagnosis:    [  ] No active nutrition diagnosis at this time  [  ] Current medical condition precludes nutrition intervention    Goal:    Recommendations:    Education:     Risk Level: High [   ] Moderate [   ] Low [   ] Admitting Diagnosis:   Patient is a 49y old  Female who presents with a chief complaint of Acute Cholecystitis (29 Jul 2020 07:26)    Consult: Yes [   ]  No [ X  ]    Reason for Initial Nutrition Assessment: LOS    PAST MEDICAL & SURGICAL HISTORY:  Gastritis  No pertinent past medical history  S/P appendectomy    Current Nutrition Order: NPO    PO Intake: Good (%) [   ]  Fair (50-75%) [   ] Poor (<25%) [   ]-N/A    GI Issues: +nausea but no vomiting per pt (ordered for zofran), last BM 7/27 (pt reports regular BM PTA)    Pain: pt endorses epigastric pain as well as a headache    Skin Integrity: Kenny score 20    Labs:   07-29    138  |  103  |  6<L>  ----------------------------<  92  3.9   |  26  |  0.70    Ca    8.5      29 Jul 2020 07:28  Phos  2.4     07-29  Mg     1.9     07-29    TPro  6.3  /  Alb  3.7  /  TBili  0.6  /  DBili  x   /  AST  138<H>  /  ALT  207<H>  /  AlkPhos  162<H>  07-29    Nutritionally Pertinent Lab Values:  7/29: BUN 6, Alk phos 162, , , phos 2.4    Medications:  MEDICATIONS  (STANDING):  heparin   Injectable 5000 Unit(s) SubCutaneous every 8 hours  lactated ringers. 1000 milliLiter(s) (110 mL/Hr) IV Continuous <Continuous>  pantoprazole  Injectable 40 milliGRAM(s) IV Push daily  potassium phosphate IVPB 15 milliMole(s) IV Intermittent once    MEDICATIONS  (PRN):  HYDROmorphone  Injectable 0.5 milliGRAM(s) IV Push every 4 hours PRN Moderate Pain (4 - 6)  ondansetron Injectable 4 milliGRAM(s) IV Push every 6 hours PRN Nausea    Admitted Anthropometrics:  Ht (7/29): 157.5cm, Wt (7/29): 68.9kg, IBW: 50kg+/-10%, %IBW: 137%, BMI: 27.8     Nutrition Focused Physical Exam: Completed [ X on 7/29  ]  Unable to complete [   ]  No significant findings on NFPE; RD to monitor closely     Estimated energy needs:   IBW (50kg) used to calculate energy needs due to pt's current body weight exceeding 120% of IBW.  Needs adjusted for pre-op.    8880-5119 kcal (25-30 kcal/kg IBW)  50-60gm protein (1.0-1.2gm/kg IBW)  Fluid needs per team    Subjective: 48 y/o F pmh Gastritis, PSH Appendectomy p/w 1 day of abdominal pain. U/S revealed multiple stones filling the gallbladder lumen w/ + Tabor sign suspicious for cholecystitis dilated extrahepatic bile duct 1.3cm w/o sonographic evidence of choledocholithiasis. Presentation c/w acute cholecystitis vs choledocholithiasis vs stricture now s/p ERCP with sphincterotomy, removal of common bile duct stone, placement of plastic double pigtail stent. Plan for OR today for lap rosalee, pt NPO, resting in bed during assessment. Pt states appetite good PTA, follows regular diet, denies food allergies. Pt states UBW is 167lb and reports weighing 152lb at this time, pt last remembers weighing 167lb ~4 months ago, indicating ~15lb weight loss (~9%)- significant in the last 4 months. Pt states appetite has remained good during this time, denies trying to lose weight, no muscle or fat loss noted on physical exam. Please see below for full nutritional recommendations- d/w team. RD to monitor and f/u per protocol.    Nutrition Diagnosis:  Inadequate energy intake RT inability to meet EER on NPO status AEB pt meeting 0%EER at this time    Goal: Diet to advance within 24-48h. Pt to meet >75%EER PO with good tolerance when diet advanced    Recommendations:  1. As medically appropriate, recommend advance diet to CLD->low fat  2. Monitor labs (BMP, lytes); replete lytes prn  3. Trend bi-weekly weights  4. Monitor need to add bowel regimen    Education: Diet advancement CLD-> low fat post-op, discussed low fat diet with pt- pt appeared receptive    Risk Level: High [ X ] Moderate [   ] Low [   ]

## 2020-07-30 ENCOUNTER — TRANSCRIPTION ENCOUNTER (OUTPATIENT)
Age: 50
End: 2020-07-30

## 2020-07-30 VITALS
OXYGEN SATURATION: 95 % | RESPIRATION RATE: 16 BRPM | TEMPERATURE: 99 F | HEART RATE: 64 BPM | SYSTOLIC BLOOD PRESSURE: 107 MMHG | DIASTOLIC BLOOD PRESSURE: 69 MMHG

## 2020-07-30 LAB
ALBUMIN SERPL ELPH-MCNC: 3.6 G/DL — SIGNIFICANT CHANGE UP (ref 3.3–5)
ALP SERPL-CCNC: 150 U/L — HIGH (ref 40–120)
ALT FLD-CCNC: 150 U/L — HIGH (ref 10–45)
ANION GAP SERPL CALC-SCNC: 9 MMOL/L — SIGNIFICANT CHANGE UP (ref 5–17)
AST SERPL-CCNC: 90 U/L — HIGH (ref 10–40)
BILIRUB SERPL-MCNC: 0.5 MG/DL — SIGNIFICANT CHANGE UP (ref 0.2–1.2)
BUN SERPL-MCNC: 5 MG/DL — LOW (ref 7–23)
CALCIUM SERPL-MCNC: 8.1 MG/DL — LOW (ref 8.4–10.5)
CHLORIDE SERPL-SCNC: 103 MMOL/L — SIGNIFICANT CHANGE UP (ref 96–108)
CO2 SERPL-SCNC: 27 MMOL/L — SIGNIFICANT CHANGE UP (ref 22–31)
CREAT SERPL-MCNC: 0.74 MG/DL — SIGNIFICANT CHANGE UP (ref 0.5–1.3)
GLUCOSE SERPL-MCNC: 97 MG/DL — SIGNIFICANT CHANGE UP (ref 70–99)
HCT VFR BLD CALC: 36.8 % — SIGNIFICANT CHANGE UP (ref 34.5–45)
HGB BLD-MCNC: 11.7 G/DL — SIGNIFICANT CHANGE UP (ref 11.5–15.5)
MAGNESIUM SERPL-MCNC: 2 MG/DL — SIGNIFICANT CHANGE UP (ref 1.6–2.6)
MCHC RBC-ENTMCNC: 30 PG — SIGNIFICANT CHANGE UP (ref 27–34)
MCHC RBC-ENTMCNC: 31.8 GM/DL — LOW (ref 32–36)
MCV RBC AUTO: 94.4 FL — SIGNIFICANT CHANGE UP (ref 80–100)
NRBC # BLD: 0 /100 WBCS — SIGNIFICANT CHANGE UP (ref 0–0)
PHOSPHATE SERPL-MCNC: 2.6 MG/DL — SIGNIFICANT CHANGE UP (ref 2.5–4.5)
PLATELET # BLD AUTO: 190 K/UL — SIGNIFICANT CHANGE UP (ref 150–400)
POTASSIUM SERPL-MCNC: 4.1 MMOL/L — SIGNIFICANT CHANGE UP (ref 3.5–5.3)
POTASSIUM SERPL-SCNC: 4.1 MMOL/L — SIGNIFICANT CHANGE UP (ref 3.5–5.3)
PROT SERPL-MCNC: 6.1 G/DL — SIGNIFICANT CHANGE UP (ref 6–8.3)
RBC # BLD: 3.9 M/UL — SIGNIFICANT CHANGE UP (ref 3.8–5.2)
RBC # FLD: 13.1 % — SIGNIFICANT CHANGE UP (ref 10.3–14.5)
SODIUM SERPL-SCNC: 139 MMOL/L — SIGNIFICANT CHANGE UP (ref 135–145)
WBC # BLD: 4.93 K/UL — SIGNIFICANT CHANGE UP (ref 3.8–10.5)
WBC # FLD AUTO: 4.93 K/UL — SIGNIFICANT CHANGE UP (ref 3.8–10.5)

## 2020-07-30 PROCEDURE — 99233 SBSQ HOSP IP/OBS HIGH 50: CPT | Mod: GC

## 2020-07-30 RX ORDER — OXYCODONE HYDROCHLORIDE 5 MG/1
1 TABLET ORAL
Qty: 4 | Refills: 0
Start: 2020-07-30 | End: 2020-07-31

## 2020-07-30 RX ORDER — OXYCODONE HYDROCHLORIDE 5 MG/1
1 TABLET ORAL
Qty: 1 | Refills: 0
Start: 2020-07-30 | End: 2020-07-30

## 2020-07-30 RX ADMIN — Medication 15 MILLIGRAM(S): at 11:20

## 2020-07-30 RX ADMIN — Medication 15 MILLIGRAM(S): at 11:05

## 2020-07-30 RX ADMIN — Medication 15 MILLIGRAM(S): at 05:01

## 2020-07-30 RX ADMIN — Medication 400 MILLIGRAM(S): at 05:00

## 2020-07-30 RX ADMIN — HEPARIN SODIUM 5000 UNIT(S): 5000 INJECTION INTRAVENOUS; SUBCUTANEOUS at 05:01

## 2020-07-30 RX ADMIN — OXYCODONE HYDROCHLORIDE 10 MILLIGRAM(S): 5 TABLET ORAL at 09:12

## 2020-07-30 RX ADMIN — OXYCODONE HYDROCHLORIDE 10 MILLIGRAM(S): 5 TABLET ORAL at 09:52

## 2020-07-30 RX ADMIN — Medication 1000 MILLIGRAM(S): at 06:00

## 2020-07-30 RX ADMIN — HEPARIN SODIUM 5000 UNIT(S): 5000 INJECTION INTRAVENOUS; SUBCUTANEOUS at 13:26

## 2020-07-30 RX ADMIN — Medication 15 MILLIGRAM(S): at 06:00

## 2020-07-30 NOTE — DISCHARGE NOTE PROVIDER - NS AS DC PROVIDER CONTACT Y/N MULTI
Yes Positioning (Leave Blank If You Do Not Want): The patient was placed in a comfortable position exposing the surgical site.

## 2020-07-30 NOTE — DISCHARGE NOTE PROVIDER - NSDCMRMEDTOKEN_GEN_ALL_CORE_FT
ibuprofen 600 mg oral tablet: 1 tab(s) orally every 8 hours   ibuprofen 800 mg oral tablet: 1 tab(s) orally every 8 hours  oxycodone-acetaminophen 5 mg-325 mg oral tablet: 1 tab(s) orally every 6 hours, As Needed -for severe pain MDD:4   Percocet 5/325 oral tablet: 1 tab(s) orally every 6 hours MDD:4 tabs  Valium 5 mg oral tablet: 1 tab(s) orally every 8 hours MDD:3 oxycodone-acetaminophen 5 mg-325 mg oral tablet: 1 tab(s) orally every 6 hours, As Needed -for severe pain MDD:4   Valium 5 mg oral tablet: 1 tab(s) orally every 8 hours MDD:3

## 2020-07-30 NOTE — DISCHARGE NOTE NURSING/CASE MANAGEMENT/SOCIAL WORK - PATIENT PORTAL LINK FT
You can access the FollowMyHealth Patient Portal offered by [facility] by registering at the following website: [email or web address]. By joining [employer]’s FollowMyHealth portal, you will also be able to view your health information using other applications (apps) compatible with our system.

## 2020-07-30 NOTE — PROGRESS NOTE ADULT - REASON FOR ADMISSION
Acute Cholecystitis

## 2020-07-30 NOTE — DISCHARGE NOTE PROVIDER - HOSPITAL COURSE
50 y/o F pmh Gastritis, PSH Appendectomy p/w 1 day of abdominal pain. Patient states pain began yesterday as dull, intermittent, epigastric pain radiating to the back. Pain worsened by meals, no alleviating factors. Associated with nausea and nbnb emesis. Passing flatus and having BMs. Denies fevers, chills, cp, sob, constipation. Patient was admitted to General Surgery for further management. Pt was made NPO and received IV antibiotics, IV fluids and adequate pain medication. Remained afebrile and hemodynamically stable. Pt was taken to the OR for a laparoscopic cholecystectomy. Gallbladder was non-perforated and non-gangrenous. The operation was uncomplicated and post-operative course was uneventful. Pt is now tolerating a low fat diet without nausea/vomiting, ambulating without difficulty, and voiding spontaneously with bowel function. Pain is well controlled on oral pain medication. Pt has been deemed ready for discharge and will follow up with the surgeon.

## 2020-07-30 NOTE — PROGRESS NOTE ADULT - PROBLEM SELECTOR PLAN 1
MRCP showing distal CBD filling defect. Patient s/p ERCP with sphincterotomy, stone removal, and 7Fr - 7cm double-pigtail plastic biliary stent placement  - s/p lap cholecystectomy

## 2020-07-30 NOTE — DISCHARGE NOTE PROVIDER - CARE PROVIDER_API CALL
Dax Pedroza  COLON/RECTAL SURGERY  [address]  [place] [address]  Phone: [phone]  Fax: [phone]  Follow Up Time: Dax Pedroza  COLON/RECTAL SURGERY  [address]  [place] [address]  Phone: [phone]  Fax: [phone]  Follow Up Time:     Rajeev Pierson  GASTROENTEROLOGY  [address] [address]  Phone: [phone]  Fax: [phone]  Follow Up Time:

## 2020-07-30 NOTE — PROGRESS NOTE ADULT - SUBJECTIVE AND OBJECTIVE BOX
O/N history: none    Subjective:  Pt reports continued pain in epigastric region, although pain medication has helped. Reports N, denies vj vomiting ROS is otherwise negative.       Allergies    No Known Allergies    Intolerances    Home meds: none    MEDICATIONS  (STANDING):  acetaminophen  IVPB .. 1000 milliGRAM(s) IV Intermittent once  heparin   Injectable 5000 Unit(s) SubCutaneous every 8 hours  ketorolac   Injectable 15 milliGRAM(s) IV Push every 6 hours  lactated ringers. 1000 milliLiter(s) (100 mL/Hr) IV Continuous <Continuous>    MEDICATIONS  (PRN):  HYDROmorphone  Injectable 0.5 milliGRAM(s) IV Push every 10 minutes PRN breakthrough pain in pacu  HYDROmorphone  Injectable 0.5 milliGRAM(s) IV Push every 4 hours PRN breakthrough pain on floor  ondansetron    Tablet 4 milliGRAM(s) Oral every 6 hours PRN Nausea and/or Vomiting  oxyCODONE    IR 5 milliGRAM(s) Oral every 4 hours PRN Moderate Pain (4 - 6)  oxyCODONE    IR 10 milliGRAM(s) Oral every 4 hours PRN Severe Pain (7 - 10)    Drug Dosing Weight  Height (cm): 157.48 (28 Jul 2020 16:04)  Weight (kg): 68.9 (28 Jul 2020 01:41)  BMI (kg/m2): 27.8 (28 Jul 2020 16:04)  BSA (m2): 1.7 (28 Jul 2020 16:04)    PAST MEDICAL & SURGICAL HISTORY:  Gastritis  No pertinent past medical history  S/P appendectomy      FAMILY HISTORY:  no cardiac disease    SOCIAL HISTORY:  no smoking, no EtOH use, no drug use    ADVANCE DIRECTIVES:    Vital Signs Last 24 Hrs  T(C): 36.6 (29 Jul 2020 20:19), Max: 37 (29 Jul 2020 15:16)  T(F): 97.9 (29 Jul 2020 20:19), Max: 98.6 (29 Jul 2020 15:16)  HR: 64 (29 Jul 2020 20:19) (57 - 67)  BP: 95/60 (29 Jul 2020 20:19) (90/47 - 110/70)  BP(mean): 74 (29 Jul 2020 15:46) (73 - 75)  RR: 18 (29 Jul 2020 20:19) (16 - 18)  SpO2: 99% (29 Jul 2020 20:19) (97% - 100%)        PHYSICAL EXAM:      Constitutional: NAD  Eyes: PERRLA  ENMT: MMM  Neck: supple  Back: midline  Respiratory: CTA b/l  Cardiovascular: rrr, s1s2, no m/r/g  Gastrointestinal: TTP in epigastric region, soft, non distended, + BS  Extremities: wwp  Vascular: + 2 pulses DP/TP  Neurological: AAO x 4  Skin: no rash  Lymph Nodes: no LAD  Musculoskeletal: no joint swelling  Psychiatric: normal affect    LABS:                        12.0   4.32  )-----------( 196      ( 29 Jul 2020 07:28 )             37.7   07-29    138  |  103  |  6<L>  ----------------------------<  92  3.9   |  26  |  0.70    Ca    8.5      29 Jul 2020 07:28  Phos  2.4     07-29  Mg     1.9     07-29    TPro  6.3  /  Alb  3.7  /  TBili  0.6  /  DBili  x   /  AST  138<H>  /  ALT  207<H>  /  AlkPhos  162<H>  07-29        EKG:    ECHO, US:    RADIOLOGY:  < from: US Abdomen Limited (07.27.20 @ 17:11) >  IMPRESSION:  1.  Multiple stones filling the gallbladder lumen with positive sonographic Tabor's sign suspicious for acute cholecystitis.  2.  Dilated extrahepatic bile duct 1.3 cm without sonographic evidence of choledocholithiasis. No intrahepatic duct dilatation. MRI/MRCP might be considered for further assessment.    < end of copied text >
O/N history: s/p rosalee    Subjective:  Pt reports continued pain in epigastric region, although pain has improved. Reports N, denies vj vomiting ROS is otherwise negative.       Allergies    No Known Allergies    Intolerances    Home meds: none    MEDICATIONS  (STANDING):  heparin   Injectable 5000 Unit(s) SubCutaneous every 8 hours  ketorolac   Injectable 15 milliGRAM(s) IV Push every 6 hours  lactated ringers. 1000 milliLiter(s) (100 mL/Hr) IV Continuous <Continuous>    MEDICATIONS  (PRN):  HYDROmorphone  Injectable 0.5 milliGRAM(s) IV Push every 10 minutes PRN breakthrough pain in pacu  HYDROmorphone  Injectable 0.5 milliGRAM(s) IV Push every 4 hours PRN breakthrough pain on floor  ondansetron    Tablet 4 milliGRAM(s) Oral every 6 hours PRN Nausea and/or Vomiting  oxyCODONE    IR 5 milliGRAM(s) Oral every 4 hours PRN Moderate Pain (4 - 6)  oxyCODONE    IR 10 milliGRAM(s) Oral every 4 hours PRN Severe Pain (7 - 10)      Drug Dosing Weight  Height (cm): 157.48 (28 Jul 2020 16:04)  Weight (kg): 68.9 (28 Jul 2020 01:41)  BMI (kg/m2): 27.8 (28 Jul 2020 16:04)  BSA (m2): 1.7 (28 Jul 2020 16:04)    PAST MEDICAL & SURGICAL HISTORY:  Gastritis  No pertinent past medical history  S/P appendectomy      FAMILY HISTORY:  no cardiac disease    SOCIAL HISTORY:  no smoking, no EtOH use, no drug use    ADVANCE DIRECTIVES:    Vital Signs Last 24 Hrs  T(C): 37.1 (30 Jul 2020 08:58), Max: 37.1 (30 Jul 2020 08:58)  T(F): 98.7 (30 Jul 2020 08:58), Max: 98.7 (30 Jul 2020 08:58)  HR: 64 (30 Jul 2020 08:58) (57 - 67)  BP: 107/69 (30 Jul 2020 08:58) (90/47 - 111/74)  BP(mean): 74 (29 Jul 2020 15:46) (73 - 75)  RR: 16 (30 Jul 2020 08:58) (16 - 18)  SpO2: 95% (30 Jul 2020 08:58) (95% - 100%)        PHYSICAL EXAM:      Constitutional: NAD  Eyes: PERRLA  ENMT: MMM  Neck: supple  Back: midline  Respiratory: CTA b/l  Cardiovascular: rrr, s1s2, no m/r/g  Gastrointestinal: TTP in epigastric region, soft, non distended, + BS, CDI  Extremities: wwp  Vascular: + 2 pulses DP/TP  Neurological: AAO x 4  Skin: no rash  Lymph Nodes: no LAD  Musculoskeletal: no joint swelling  Psychiatric: normal affect    LABS:                                   11.7   4.93  )-----------( 190      ( 30 Jul 2020 07:45 )             36.8   07-30    139  |  103  |  5<L>  ----------------------------<  97  4.1   |  27  |  0.74    Ca    8.1<L>      30 Jul 2020 07:45  Phos  2.6     07-30  Mg     2.0     07-30    TPro  6.1  /  Alb  3.6  /  TBili  0.5  /  DBili  x   /  AST  90<H>  /  ALT  150<H>  /  AlkPhos  150<H>  07-30          EKG:    ECHO, US:    RADIOLOGY:  < from: US Abdomen Limited (07.27.20 @ 17:11) >  IMPRESSION:  1.  Multiple stones filling the gallbladder lumen with positive sonographic Tabor's sign suspicious for acute cholecystitis.  2.  Dilated extrahepatic bile duct 1.3 cm without sonographic evidence of choledocholithiasis. No intrahepatic duct dilatation. MRI/MRCP might be considered for further assessment.    < end of copied text >
Pre-Op Dx: Cholecystitis     Procedure: Laparoscopic cholecystectomy    Surgeon: Dr Pedroza    Subjective: Patient seen and examined in PACU, pain well controlled, denies N/V/CP/SOB. Denies bowel fxn.       Vital Signs Last 24 Hrs  T(C): 36.3 (29 Jul 2020 17:30), Max: 37 (29 Jul 2020 15:16)  T(F): 97.4 (29 Jul 2020 17:30), Max: 98.6 (29 Jul 2020 15:16)  HR: 58 (29 Jul 2020 17:30) (57 - 67)  BP: 98/56 (29 Jul 2020 17:30) (90/47 - 110/70)  BP(mean): 74 (29 Jul 2020 15:46) (73 - 75)  RR: 16 (29 Jul 2020 17:30) (16 - 18)  SpO2: 98% (29 Jul 2020 17:30) (97% - 98%)    Physical Exam:  General: NAD, resting comfortably in bed  Pulmonary: Nonlabored breathing, no respiratory distress  Cardiovascular: NSR  Abdominal: soft, ND, karis-incisional tenderness, incisions clean, dry, glued, ELVIE x1 SS  Extremities: WWP, normal strength      LABS:                        12.0   4.32  )-----------( 196      ( 29 Jul 2020 07:28 )             37.7     07-29    138  |  103  |  6<L>  ----------------------------<  92  3.9   |  26  |  0.70    Ca    8.5      29 Jul 2020 07:28  Phos  2.4     07-29  Mg     1.9     07-29    TPro  6.3  /  Alb  3.7  /  TBili  0.6  /  DBili  x   /  AST  138<H>  /  ALT  207<H>  /  AlkPhos  162<H>  07-29    PT/INR - ( 29 Jul 2020 07:28 )   PT: 12.7 sec;   INR: 1.06          PTT - ( 29 Jul 2020 07:28 )  PTT:35.1 sec  CAPILLARY BLOOD GLUCOSE        Urinalysis Basic - ( 27 Jul 2020 18:18 )    Color: Yellow / Appearance: Clear / SG: >=1.030 / pH: x  Gluc: x / Ketone: 15 mg/dL  / Bili: Small / Urobili: 2.0 E.U./dL   Blood: x / Protein: Trace mg/dL / Nitrite: NEGATIVE   Leuk Esterase: NEGATIVE / RBC: < 5 /HPF / WBC < 5 /HPF   Sq Epi: x / Non Sq Epi: 0-5 /HPF / Bacteria: Present /HPF      LIVER FUNCTIONS - ( 29 Jul 2020 07:28 )  Alb: 3.7 g/dL / Pro: 6.3 g/dL / ALK PHOS: 162 U/L / ALT: 207 U/L / AST: 138 U/L / GGT: x
SUBJECTIVE: C/o some epigastric pain. Patient seen and examined bedside by chief resident.    heparin   Injectable 5000 Unit(s) SubCutaneous every 8 hours    MEDICATIONS  (PRN):  ondansetron Injectable 4 milliGRAM(s) IV Push every 6 hours PRN Nausea      I&O's Detail    27 Jul 2020 07:01  -  28 Jul 2020 07:00  --------------------------------------------------------  IN:    lactated ringers.: 880 mL  Total IN: 880 mL    OUT:    Voided: 650 mL  Total OUT: 650 mL    Total NET: 230 mL          Vital Signs Last 24 Hrs  T(C): 36.6 (28 Jul 2020 05:29), Max: 36.9 (27 Jul 2020 15:28)  T(F): 97.8 (28 Jul 2020 05:29), Max: 98.5 (27 Jul 2020 21:50)  HR: 75 (28 Jul 2020 05:29) (55 - 75)  BP: 115/62 (28 Jul 2020 05:29) (96/52 - 115/62)  BP(mean): --  RR: 16 (28 Jul 2020 05:29) (16 - 18)  SpO2: 96% (28 Jul 2020 05:29) (96% - 99%)    General: NAD, resting comfortably in bed  C/V: NSR  Pulm: Nonlabored breathing, no respiratory distress  Abd: soft, nondistended, slight TTP epigastric  Extrem: SCDs in place    LABS:                        12.9   7.20  )-----------( 230      ( 27 Jul 2020 16:08 )             40.2     07-28    137  |  104  |  9   ----------------------------<  111<H>  3.6   |  24  |  0.63    Ca    8.2<L>      28 Jul 2020 06:20  Phos  1.6     07-28  Mg     2.0     07-28    TPro  6.3  /  Alb  3.8  /  TBili  1.2  /  DBili  x   /  AST  377<H>  /  ALT  273<H>  /  AlkPhos  170<H>  07-28    PT/INR - ( 27 Jul 2020 18:06 )   PT: 12.1 sec;   INR: 1.01          PTT - ( 27 Jul 2020 18:06 )  PTT:28.6 sec  Urinalysis Basic - ( 27 Jul 2020 18:18 )    Color: Yellow / Appearance: Clear / SG: >=1.030 / pH: x  Gluc: x / Ketone: 15 mg/dL  / Bili: Small / Urobili: 2.0 E.U./dL   Blood: x / Protein: Trace mg/dL / Nitrite: NEGATIVE   Leuk Esterase: NEGATIVE / RBC: < 5 /HPF / WBC < 5 /HPF   Sq Epi: x / Non Sq Epi: 0-5 /HPF / Bacteria: Present /HPF
SUBJECTIVE: Feeling ok. Patient seen and examined bedside by chief resident.    heparin   Injectable 5000 Unit(s) SubCutaneous every 8 hours    MEDICATIONS  (PRN):  ondansetron Injectable 4 milliGRAM(s) IV Push every 6 hours PRN Nausea      I&O's Detail    28 Jul 2020 07:01  -  29 Jul 2020 07:00  --------------------------------------------------------  IN:    lactated ringers.: 770 mL  Total IN: 770 mL    OUT:    Voided: 850 mL  Total OUT: 850 mL    Total NET: -80 mL          Vital Signs Last 24 Hrs  T(C): 36.8 (29 Jul 2020 05:49), Max: 36.8 (28 Jul 2020 20:31)  T(F): 98.2 (29 Jul 2020 05:49), Max: 98.2 (28 Jul 2020 20:31)  HR: 57 (29 Jul 2020 05:49) (57 - 66)  BP: 95/55 (29 Jul 2020 05:49) (92/55 - 113/69)  BP(mean): --  RR: 16 (29 Jul 2020 05:49) (16 - 18)  SpO2: 97% (29 Jul 2020 05:49) (97% - 99%)    General: NAD, resting comfortably in bed  C/V: NSR  Pulm: Nonlabored breathing, no respiratory distress  Abd: soft, nondistended, slightly TTP in epigastrum/RUQ, no rebound/guarding  Extrem:  SCDs in place    LABS:                        12.2   5.90  )-----------( 202      ( 28 Jul 2020 07:10 )             37.8     07-28    137  |  104  |  9   ----------------------------<  111<H>  3.6   |  24  |  0.63    Ca    8.2<L>      28 Jul 2020 06:20  Phos  1.6     07-28  Mg     2.0     07-28    TPro  6.3  /  Alb  3.8  /  TBili  1.2  /  DBili  x   /  AST  377<H>  /  ALT  273<H>  /  AlkPhos  170<H>  07-28    PT/INR - ( 27 Jul 2020 18:06 )   PT: 12.1 sec;   INR: 1.01          PTT - ( 27 Jul 2020 18:06 )  PTT:28.6 sec  Urinalysis Basic - ( 27 Jul 2020 18:18 )    Color: Yellow / Appearance: Clear / SG: >=1.030 / pH: x  Gluc: x / Ketone: 15 mg/dL  / Bili: Small / Urobili: 2.0 E.U./dL   Blood: x / Protein: Trace mg/dL / Nitrite: NEGATIVE   Leuk Esterase: NEGATIVE / RBC: < 5 /HPF / WBC < 5 /HPF   Sq Epi: x / Non Sq Epi: 0-5 /HPF / Bacteria: Present /HPF
SUBJECTIVE: Patient seen and examined bedside by chief resident, feels well. Complains of slight back pain. Tolerated her clear liquid diet without nausea or vomiting or severe pain. No SOB/Calf pain/severe pain. On exam, abdomen soft, non distended, slightly tender on RUQ,    heparin   Injectable 5000 Unit(s) SubCutaneous every 8 hours    MEDICATIONS  (PRN):  HYDROmorphone  Injectable 0.5 milliGRAM(s) IV Push every 10 minutes PRN breakthrough pain in pacu  HYDROmorphone  Injectable 0.5 milliGRAM(s) IV Push every 4 hours PRN breakthrough pain on floor  ondansetron    Tablet 4 milliGRAM(s) Oral every 6 hours PRN Nausea and/or Vomiting  oxyCODONE    IR 5 milliGRAM(s) Oral every 4 hours PRN Moderate Pain (4 - 6)  oxyCODONE    IR 10 milliGRAM(s) Oral every 4 hours PRN Severe Pain (7 - 10)      I&O's Detail    29 Jul 2020 07:01  -  30 Jul 2020 07:00  --------------------------------------------------------  IN:    lactated ringers.: 200 mL  Total IN: 200 mL    OUT:    Voided: 2300 mL  Total OUT: 2300 mL    Total NET: -2100 mL          Vital Signs Last 24 Hrs  T(C): 36.6 (30 Jul 2020 05:44), Max: 37 (29 Jul 2020 15:16)  T(F): 97.9 (30 Jul 2020 05:44), Max: 98.6 (29 Jul 2020 15:16)  HR: 59 (30 Jul 2020 05:44) (57 - 67)  BP: 111/74 (30 Jul 2020 05:44) (90/47 - 111/74)  BP(mean): 74 (29 Jul 2020 15:46) (73 - 75)  RR: 16 (30 Jul 2020 05:44) (16 - 18)  SpO2: 97% (30 Jul 2020 05:44) (95% - 100%)    General: NAD, resting comfortably in bed  C/V: Regular rate  Pulm: Nonlabored breathing, no respiratory distress  Abd: soft, non distended, slightly tender on RUQ,      LABS:                        12.0   4.32  )-----------( 196      ( 29 Jul 2020 07:28 )             37.7     07-29    138  |  103  |  6<L>  ----------------------------<  92  3.9   |  26  |  0.70    Ca    8.5      29 Jul 2020 07:28  Phos  2.4     07-29  Mg     1.9     07-29    TPro  6.3  /  Alb  3.7  /  TBili  0.6  /  DBili  x   /  AST  138<H>  /  ALT  207<H>  /  AlkPhos  162<H>  07-29    PT/INR - ( 29 Jul 2020 07:28 )   PT: 12.7 sec;   INR: 1.06          PTT - ( 29 Jul 2020 07:28 )  PTT:35.1 sec      RADIOLOGY & ADDITIONAL STUDIES:      Culture - Urine (collected 07-28-20 @ 01:04)  Source: .Urine Clean Catch (Midstream)  Final Report (07-29-20 @ 08:39):    Less than 10,000 cols/cc; Insignificant amount of growth.
GASTROENTEROLOGY PROGRESS NOTE  Patient seen and examined at bedside. Patient with minimal epigastric tenderness, no fevers, chills, nausea, or vomiting.    PERTINENT REVIEW OF SYSTEMS:  CONSTITUTIONAL: No weakness, fevers or chills  HEENT: No visual changes; No vertigo or throat pain   GASTROINTESTINAL: No abdominal or epigastric pain. No nausea, vomiting, or hematemesis; No diarrhea or constipation. No melena or hematochezia.  NEUROLOGICAL: No numbness or weakness  SKIN: No itching, burning, rashes, or lesions     Allergies    No Known Allergies    Intolerances      MEDICATIONS:  MEDICATIONS  (STANDING):  heparin   Injectable 5000 Unit(s) SubCutaneous every 8 hours  lactated ringers. 1000 milliLiter(s) (110 mL/Hr) IV Continuous <Continuous>  pantoprazole  Injectable 40 milliGRAM(s) IV Push daily  potassium phosphate IVPB 15 milliMole(s) IV Intermittent once    MEDICATIONS  (PRN):  HYDROmorphone  Injectable 0.5 milliGRAM(s) IV Push every 4 hours PRN Moderate Pain (4 - 6)  ondansetron Injectable 4 milliGRAM(s) IV Push every 6 hours PRN Nausea    Vital Signs Last 24 Hrs  T(C): 36.8 (29 Jul 2020 08:29), Max: 36.8 (28 Jul 2020 20:31)  T(F): 98.3 (29 Jul 2020 08:29), Max: 98.3 (29 Jul 2020 02:52)  HR: 60 (29 Jul 2020 08:29) (57 - 66)  BP: 100/66 (29 Jul 2020 08:29) (92/55 - 113/69)  BP(mean): --  RR: 16 (29 Jul 2020 08:29) (16 - 18)  SpO2: 98% (29 Jul 2020 08:29) (97% - 98%)    07-28 @ 07:01  -  07-29 @ 07:00  --------------------------------------------------------  IN: 770 mL / OUT: 850 mL / NET: -80 mL      PHYSICAL EXAM:    General: Well developed; well nourished; in no acute distress  HEENT: MMM, conjunctiva and sclera clear  Gastrointestinal: Soft non-tender non-distended; Normal bowel sounds; No hepatosplenomegaly. No rebound or guarding  Skin: Warm and dry. No obvious rash    LABS:                        12.0   4.32  )-----------( 196      ( 29 Jul 2020 07:28 )             37.7     07-29    138  |  103  |  6<L>  ----------------------------<  92  3.9   |  26  |  0.70    Ca    8.5      29 Jul 2020 07:28  Phos  2.4     07-29  Mg     1.9     07-29    TPro  6.3  /  Alb  3.7  /  TBili  0.6  /  DBili  x   /  AST  138<H>  /  ALT  207<H>  /  AlkPhos  162<H>  07-29    PT/INR - ( 29 Jul 2020 07:28 )   PT: 12.7 sec;   INR: 1.06          PTT - ( 29 Jul 2020 07:28 )  PTT:35.1 sec      Urinalysis Basic - ( 27 Jul 2020 18:18 )    Color: Yellow / Appearance: Clear / SG: >=1.030 / pH: x  Gluc: x / Ketone: 15 mg/dL  / Bili: Small / Urobili: 2.0 E.U./dL   Blood: x / Protein: Trace mg/dL / Nitrite: NEGATIVE   Leuk Esterase: NEGATIVE / RBC: < 5 /HPF / WBC < 5 /HPF   Sq Epi: x / Non Sq Epi: 0-5 /HPF / Bacteria: Present /HPF                Culture - Urine (collected 28 Jul 2020 01:04)  Source: .Urine Clean Catch (Midstream)  Final Report (29 Jul 2020 08:39):    Less than 10,000 cols/cc; Insignificant amount of growth.      RADIOLOGY & ADDITIONAL STUDIES:  Reviewed

## 2020-07-30 NOTE — DISCHARGE NOTE PROVIDER - NSDCFUADDINST_GEN_ALL_CORE_FT
General Discharge Instructions:  Please resume all regular home medications unless specifically advised not to take a particular medication. Also, please take any new medications as prescribed.  Please get plenty of rest, continue to ambulate several times per day, and drink adequate amounts of fluids. Avoid lifting weights greater than 5-10 lbs until you follow-up with your surgeon, who will instruct you further regarding activity restrictions.  Avoid driving or operating heavy machinery while taking pain medications.  Please follow-up with your surgeon and Primary Care Provider (PCP) as advised.  Incision Care:  *Please call your doctor or nurse practitioner if you have increased pain, swelling, redness, or drainage from the incision site.  *Avoid swimming and baths until your follow-up appointment.  *You may shower, and wash surgical incisions with a mild soap and warm water. Gently pat the area dry.  *If you have staples, they will be removed at your follow-up appointment.  *If you have steri-strips, they will fall off on their own. Please remove any remaining strips 7-10 days after surgery.    Please call to set up an appointment w Dr. Pedroza 1-2 weeks after you have been discharged from the hospital.     Please follow up w Dr. Pierson in 1 month to have your stent removed.

## 2020-07-30 NOTE — PROGRESS NOTE ADULT - ASSESSMENT
49F w/ a PMH significant for gastritis and appendicitis s/p appendectomy who presented to the ED w/ epigastric pain of 1 day duration. Liver chemistries have started to uptrend, and RUQ U/S shows extrahepatic biliary ductal dilation to 1.3 cm and multiple gallstones on the GB. CBD measured 0.8 cm near dillon hepatis and 1.3 cm intrapancreatic portion. +sonographic Tabor sign    1. Choledocholithiasis - Suspected based off of laboratory and imaging findings with MRCP showing distal CBD filling defect. Patient s/p ERCP with sphincterotomy, stone removal, and 7Fr - 7cm double-pigtail plastic biliary stent placement. LTs now downtrending.  - Trend LTs  - Proceed to laparoscopic cholecystectomy per surgery  - Patient will need repeat ERCP for stent removal in 6-8 weeks    Recommendations discussed with primary team  Case discussed with attending physician  Please recall as needed with any gastroenterological questions  Please schedule patient for follow-up with Dr. Pierson at [phone]
49 F pmh Gastritis, PSH Appendectomy p/w 1 day of abdominal pain w/ possible acute cholecystitis vs choledocholithiasis vs stricture
49 F pmh Gastritis, PSH Appendectomy p/w 1 day of abdominal pain w/ possible acute cholecystitis vs choledocholithiasis vs stricture now s/p lap cholecystectomy
50 y/o F pmh Gastritis, PSH Appendectomy p/w 1 day of abdominal pain. Afebrile HD stable. WBC 7.20, Tbili 0.9, ALk phos 136, Lipase 23. U/S reveals multiple stones filling the gallbladder lumen w/ + Tabor sign suspicious for cholecystitis dilated extrahepatic bile duct 1.3cm w/o sonographic evidence of choledocholithiasis. Presentation c/w acute cholecystitis vs choledocholithiasis vs stricture    NPO/IVF, protonix  Pain/nausea control, minimal narcotics  OOBA/IS/SCDs/SQH  f/u MRCP
50 y/o F pmh Gastritis, PSH Appendectomy p/w 1 day of abdominal pain. Afebrile HD stable. WBC 7.20, Tbili 0.9, ALk phos 136, Lipase 23. U/S reveals multiple stones filling the gallbladder lumen w/ + Tabor sign suspicious for cholecystitis dilated extrahepatic bile duct 1.3cm w/o sonographic evidence of choledocholithiasis. Presentation c/w acute cholecystitis vs choledocholithiasis vs stricture now s/p ERCP with sphincterotomy, removal of common bile duct stone, placement of plastic double pigtail stent now s/p lap cholecystectomy    CLD/IVF  Pain/nausea control  OOBA/IS  SCDs/SQH  AM labs
50 y/o F pmh Gastritis, PSH Appendectomy p/w 1 day of abdominal pain. Afebrile HD stable. WBC 7.20, Tbili 0.9, ALk phos 136, Lipase 23. U/S reveals multiple stones filling the gallbladder lumen w/ + Tabor sign suspicious for cholecystitis dilated extrahepatic bile duct 1.3cm w/o sonographic evidence of choledocholithiasis. Presentation c/w acute cholecystitis vs choledocholithiasis vs stricture now s/p ERCP with sphincterotomy, removal of common bile duct stone, placement of plastic double pigtail stent now s/p lap cholecystectomy    CLD/IVF  Pain/nausea control  OOBA/IS  SCDs/SQH  AM labs
50 y/o F pmh Gastritis, PSH Appendectomy p/w 1 day of abdominal pain. Afebrile HD stable. WBC 7.20, Tbili 0.9, ALk phos 136, Lipase 23. U/S reveals multiple stones filling the gallbladder lumen w/ + Tabor sign suspicious for cholecystitis dilated extrahepatic bile duct 1.3cm w/o sonographic evidence of choledocholithiasis. Presentation c/w acute cholecystitis vs choledocholithiasis vs stricture now s/p ERCP with sphincterotomy, removal of common bile duct stone, placement of plastic double pigtail stent.    NPO/IVF, protonix  Pain/nausea control, minimal narcotics  OOBA/IS/SCDs/SQH  OR for lap cholecystectomy

## 2020-08-03 LAB — SURGICAL PATHOLOGY STUDY: SIGNIFICANT CHANGE UP

## 2020-08-03 PROCEDURE — 83690 ASSAY OF LIPASE: CPT

## 2020-08-03 PROCEDURE — 99285 EMERGENCY DEPT VISIT HI MDM: CPT | Mod: 25

## 2020-08-03 PROCEDURE — 87635 SARS-COV-2 COVID-19 AMP PRB: CPT

## 2020-08-03 PROCEDURE — C1769: CPT

## 2020-08-03 PROCEDURE — 96374 THER/PROPH/DIAG INJ IV PUSH: CPT

## 2020-08-03 PROCEDURE — 88304 TISSUE EXAM BY PATHOLOGIST: CPT

## 2020-08-03 PROCEDURE — 84100 ASSAY OF PHOSPHORUS: CPT

## 2020-08-03 PROCEDURE — 84702 CHORIONIC GONADOTROPIN TEST: CPT

## 2020-08-03 PROCEDURE — 36415 COLL VENOUS BLD VENIPUNCTURE: CPT

## 2020-08-03 PROCEDURE — 96375 TX/PRO/DX INJ NEW DRUG ADDON: CPT

## 2020-08-03 PROCEDURE — 86850 RBC ANTIBODY SCREEN: CPT

## 2020-08-03 PROCEDURE — 85027 COMPLETE CBC AUTOMATED: CPT

## 2020-08-03 PROCEDURE — A9585: CPT

## 2020-08-03 PROCEDURE — 85025 COMPLETE CBC W/AUTO DIFF WBC: CPT

## 2020-08-03 PROCEDURE — 85730 THROMBOPLASTIN TIME PARTIAL: CPT

## 2020-08-03 PROCEDURE — 87086 URINE CULTURE/COLONY COUNT: CPT

## 2020-08-03 PROCEDURE — 80053 COMPREHEN METABOLIC PANEL: CPT

## 2020-08-03 PROCEDURE — C1726: CPT

## 2020-08-03 PROCEDURE — 83735 ASSAY OF MAGNESIUM: CPT

## 2020-08-03 PROCEDURE — 86901 BLOOD TYPING SEROLOGIC RH(D): CPT

## 2020-08-03 PROCEDURE — 74183 MRI ABD W/O CNTR FLWD CNTR: CPT

## 2020-08-03 PROCEDURE — 85610 PROTHROMBIN TIME: CPT

## 2020-08-03 PROCEDURE — 83605 ASSAY OF LACTIC ACID: CPT

## 2020-08-03 PROCEDURE — 71045 X-RAY EXAM CHEST 1 VIEW: CPT

## 2020-08-03 PROCEDURE — 76705 ECHO EXAM OF ABDOMEN: CPT

## 2020-08-03 PROCEDURE — 81001 URINALYSIS AUTO W/SCOPE: CPT

## 2020-08-06 DIAGNOSIS — E83.39 OTHER DISORDERS OF PHOSPHORUS METABOLISM: ICD-10-CM

## 2020-08-06 DIAGNOSIS — K29.70 GASTRITIS, UNSPECIFIED, WITHOUT BLEEDING: ICD-10-CM

## 2020-08-06 DIAGNOSIS — K80.00 CALCULUS OF GALLBLADDER WITH ACUTE CHOLECYSTITIS WITHOUT OBSTRUCTION: ICD-10-CM

## 2020-08-06 DIAGNOSIS — D72.829 ELEVATED WHITE BLOOD CELL COUNT, UNSPECIFIED: ICD-10-CM

## 2020-08-06 DIAGNOSIS — K81.0 ACUTE CHOLECYSTITIS: ICD-10-CM

## 2020-08-10 PROBLEM — K29.70 GASTRITIS, UNSPECIFIED, WITHOUT BLEEDING: Chronic | Status: ACTIVE | Noted: 2020-07-27

## 2020-08-28 ENCOUNTER — APPOINTMENT (OUTPATIENT)
Dept: GASTROENTEROLOGY | Facility: CLINIC | Age: 50
End: 2020-08-28
Payer: MEDICAID

## 2020-08-28 VITALS
HEIGHT: 62 IN | DIASTOLIC BLOOD PRESSURE: 78 MMHG | SYSTOLIC BLOOD PRESSURE: 110 MMHG | BODY MASS INDEX: 27.63 KG/M2 | HEART RATE: 71 BPM | OXYGEN SATURATION: 98 % | RESPIRATION RATE: 12 BRPM | WEIGHT: 150.13 LBS | TEMPERATURE: 97.9 F

## 2020-08-28 DIAGNOSIS — Z12.11 ENCOUNTER FOR SCREENING FOR MALIGNANT NEOPLASM OF COLON: ICD-10-CM

## 2020-08-28 DIAGNOSIS — K80.50 CALCULUS OF BILE DUCT W/OUT CHOLANGITIS OR CHOLECYSTITIS W/OUT OBSTRUCTION: ICD-10-CM

## 2020-08-28 DIAGNOSIS — Z78.9 OTHER SPECIFIED HEALTH STATUS: ICD-10-CM

## 2020-08-28 PROCEDURE — 99203 OFFICE O/P NEW LOW 30 MIN: CPT

## 2020-08-28 RX ORDER — POLYETHYLENE GLYCOL 3350 AND ELECTROLYTES WITH LEMON FLAVOR 236; 22.74; 6.74; 5.86; 2.97 G/4L; G/4L; G/4L; G/4L; G/4L
236 POWDER, FOR SOLUTION ORAL
Qty: 1 | Refills: 0 | Status: ACTIVE | COMMUNITY
Start: 2020-08-28 | End: 1900-01-01

## 2020-08-28 NOTE — ASSESSMENT
[FreeTextEntry1] : 48 yo female with a hx of choledocholithiasis s/p ERCP with stone extraction and stenting\par \par - ERCP with stent removal at [facility]\par - D/w pt regarding COVID testing pre-procedure as well as escort post-procedure\par - Risks of the procedure including bleeding, perforation, pancreatitis, etc d/w the patient\par - Also d/w pt screening colonoscopy at a later date\par - Golytely split prep\par

## 2020-08-28 NOTE — PHYSICAL EXAM
[General Appearance - Alert] : alert [General Appearance - Well Nourished] : well nourished [General Appearance - Well-Appearing] : healthy appearing [Sclera] : the sclera and conjunctiva were normal [Outer Ear] : the ears and nose were normal in appearance [Neck Appearance] : the appearance of the neck was normal [] : no respiratory distress [Abdomen Soft] : soft [Abdomen Tenderness] : non-tender [Abdomen Mass (___ Cm)] : no abdominal mass palpated [Involuntary Movements] : no involuntary movements were seen [Skin Color & Pigmentation] : normal skin color and pigmentation [No Focal Deficits] : no focal deficits [Oriented To Time, Place, And Person] : oriented to person, place, and time

## 2020-08-28 NOTE — HISTORY OF PRESENT ILLNESS
[FreeTextEntry1] : 48 yo female here for f/u after ERCP with stone extraction at [facility].   7/28/20 ERCP with sphincterotomy, removal of a small bile duct stone and sludge and finally placement of a 7 Fr 7 cm double pigtail biliary stent.  Pt with intermittent  discomfort in RT back since her cholecystectomy.  No C/F.  No N/V/D, no constipation.  No BRBPR, no dark stools.  No weight loss, no change in appetite.  No pruritis or dark urine.  No hx of an EGD or a colonoscopy.\par \par No hx of gastric, colon or pancreatic cancer.  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

## 2020-09-19 ENCOUNTER — LABORATORY RESULT (OUTPATIENT)
Age: 50
End: 2020-09-19

## 2020-09-21 ENCOUNTER — OUTPATIENT (OUTPATIENT)
Dept: OUTPATIENT SERVICES | Facility: HOSPITAL | Age: 50
LOS: 1 days | Discharge: HOME | End: 2020-09-21
Payer: MEDICAID

## 2020-09-21 ENCOUNTER — APPOINTMENT (OUTPATIENT)
Dept: GASTROENTEROLOGY | Facility: HOSPITAL | Age: 50
End: 2020-09-21

## 2020-09-21 DIAGNOSIS — Z90.49 ACQUIRED ABSENCE OF OTHER SPECIFIED PARTS OF DIGESTIVE TRACT: Chronic | ICD-10-CM

## 2020-09-21 PROCEDURE — 43275 ERCP REMOVE FORGN BODY DUCT: CPT

## 2020-09-21 PROCEDURE — 43264 ERCP REMOVE DUCT CALCULI: CPT

## 2020-09-21 PROCEDURE — C1769: CPT

## 2020-09-21 PROCEDURE — 74328 X-RAY BILE DUCT ENDOSCOPY: CPT | Mod: 26,GC

## 2020-09-23 DIAGNOSIS — Z46.89 ENCOUNTER FOR FITTING AND ADJUSTMENT OF OTHER SPECIFIED DEVICES: ICD-10-CM

## 2020-09-23 DIAGNOSIS — K80.50 CALCULUS OF BILE DUCT WITHOUT CHOLANGITIS OR CHOLECYSTITIS WITHOUT OBSTRUCTION: ICD-10-CM

## 2020-12-23 PROBLEM — Z12.11 ENCOUNTER FOR SCREENING COLONOSCOPY: Status: RESOLVED | Noted: 2020-08-28 | Resolved: 2020-12-23

## 2021-08-23 ENCOUNTER — EMERGENCY (EMERGENCY)
Facility: HOSPITAL | Age: 51
LOS: 1 days | Discharge: HOME | End: 2021-08-23
Attending: EMERGENCY MEDICINE | Admitting: EMERGENCY MEDICINE
Payer: MEDICAID

## 2021-08-23 VITALS
OXYGEN SATURATION: 99 % | DIASTOLIC BLOOD PRESSURE: 58 MMHG | RESPIRATION RATE: 16 BRPM | TEMPERATURE: 98 F | SYSTOLIC BLOOD PRESSURE: 102 MMHG | HEART RATE: 78 BPM

## 2021-08-23 VITALS
SYSTOLIC BLOOD PRESSURE: 93 MMHG | HEIGHT: 62 IN | RESPIRATION RATE: 18 BRPM | DIASTOLIC BLOOD PRESSURE: 58 MMHG | TEMPERATURE: 99 F | WEIGHT: 293 LBS | HEART RATE: 88 BPM | OXYGEN SATURATION: 98 %

## 2021-08-23 DIAGNOSIS — Z90.49 ACQUIRED ABSENCE OF OTHER SPECIFIED PARTS OF DIGESTIVE TRACT: ICD-10-CM

## 2021-08-23 DIAGNOSIS — Z90.89 ACQUIRED ABSENCE OF OTHER ORGANS: ICD-10-CM

## 2021-08-23 DIAGNOSIS — R10.32 LEFT LOWER QUADRANT PAIN: ICD-10-CM

## 2021-08-23 DIAGNOSIS — Z87.19 PERSONAL HISTORY OF OTHER DISEASES OF THE DIGESTIVE SYSTEM: ICD-10-CM

## 2021-08-23 DIAGNOSIS — Z90.49 ACQUIRED ABSENCE OF OTHER SPECIFIED PARTS OF DIGESTIVE TRACT: Chronic | ICD-10-CM

## 2021-08-23 DIAGNOSIS — K57.32 DIVERTICULITIS OF LARGE INTESTINE WITHOUT PERFORATION OR ABSCESS WITHOUT BLEEDING: ICD-10-CM

## 2021-08-23 DIAGNOSIS — R11.0 NAUSEA: ICD-10-CM

## 2021-08-23 LAB
ALBUMIN SERPL ELPH-MCNC: 3.7 G/DL — SIGNIFICANT CHANGE UP (ref 3.3–5)
ALP SERPL-CCNC: 119 U/L — SIGNIFICANT CHANGE UP (ref 40–120)
ALT FLD-CCNC: 13 U/L — SIGNIFICANT CHANGE UP (ref 10–45)
ANION GAP SERPL CALC-SCNC: 7 MMOL/L — SIGNIFICANT CHANGE UP (ref 5–17)
APPEARANCE UR: CLEAR — SIGNIFICANT CHANGE UP
APTT BLD: 28.4 SEC — SIGNIFICANT CHANGE UP (ref 27.5–35.5)
AST SERPL-CCNC: 19 U/L — SIGNIFICANT CHANGE UP (ref 10–40)
BACTERIA # UR AUTO: PRESENT /HPF
BASOPHILS # BLD AUTO: 0.03 K/UL — SIGNIFICANT CHANGE UP (ref 0–0.2)
BASOPHILS NFR BLD AUTO: 0.3 % — SIGNIFICANT CHANGE UP (ref 0–2)
BILIRUB SERPL-MCNC: 0.4 MG/DL — SIGNIFICANT CHANGE UP (ref 0.2–1.2)
BILIRUB UR-MCNC: NEGATIVE — SIGNIFICANT CHANGE UP
BUN SERPL-MCNC: 12 MG/DL — SIGNIFICANT CHANGE UP (ref 7–23)
CALCIUM SERPL-MCNC: 8.6 MG/DL — SIGNIFICANT CHANGE UP (ref 8.4–10.5)
CHLORIDE SERPL-SCNC: 107 MMOL/L — SIGNIFICANT CHANGE UP (ref 96–108)
CO2 SERPL-SCNC: 27 MMOL/L — SIGNIFICANT CHANGE UP (ref 22–31)
COLOR SPEC: YELLOW — SIGNIFICANT CHANGE UP
CREAT SERPL-MCNC: 0.82 MG/DL — SIGNIFICANT CHANGE UP (ref 0.5–1.3)
DIFF PNL FLD: ABNORMAL
EOSINOPHIL # BLD AUTO: 0.08 K/UL — SIGNIFICANT CHANGE UP (ref 0–0.5)
EOSINOPHIL NFR BLD AUTO: 0.9 % — SIGNIFICANT CHANGE UP (ref 0–6)
EPI CELLS # UR: ABNORMAL /HPF (ref 0–5)
GLUCOSE SERPL-MCNC: 91 MG/DL — SIGNIFICANT CHANGE UP (ref 70–99)
GLUCOSE UR QL: NEGATIVE — SIGNIFICANT CHANGE UP
HCG SERPL-ACNC: 1 MIU/ML — SIGNIFICANT CHANGE UP
HCT VFR BLD CALC: 34.5 % — SIGNIFICANT CHANGE UP (ref 34.5–45)
HGB BLD-MCNC: 11.2 G/DL — LOW (ref 11.5–15.5)
IMM GRANULOCYTES NFR BLD AUTO: 0.1 % — SIGNIFICANT CHANGE UP (ref 0–1.5)
INR BLD: 1.11 — SIGNIFICANT CHANGE UP (ref 0.88–1.16)
KETONES UR-MCNC: ABNORMAL MG/DL
LEUKOCYTE ESTERASE UR-ACNC: NEGATIVE — SIGNIFICANT CHANGE UP
LIDOCAIN IGE QN: 16 U/L — SIGNIFICANT CHANGE UP (ref 7–60)
LYMPHOCYTES # BLD AUTO: 1.19 K/UL — SIGNIFICANT CHANGE UP (ref 1–3.3)
LYMPHOCYTES # BLD AUTO: 13.8 % — SIGNIFICANT CHANGE UP (ref 13–44)
MCHC RBC-ENTMCNC: 30.4 PG — SIGNIFICANT CHANGE UP (ref 27–34)
MCHC RBC-ENTMCNC: 32.5 GM/DL — SIGNIFICANT CHANGE UP (ref 32–36)
MCV RBC AUTO: 93.5 FL — SIGNIFICANT CHANGE UP (ref 80–100)
MONOCYTES # BLD AUTO: 0.81 K/UL — SIGNIFICANT CHANGE UP (ref 0–0.9)
MONOCYTES NFR BLD AUTO: 9.4 % — SIGNIFICANT CHANGE UP (ref 2–14)
NEUTROPHILS # BLD AUTO: 6.52 K/UL — SIGNIFICANT CHANGE UP (ref 1.8–7.4)
NEUTROPHILS NFR BLD AUTO: 75.5 % — SIGNIFICANT CHANGE UP (ref 43–77)
NITRITE UR-MCNC: NEGATIVE — SIGNIFICANT CHANGE UP
NRBC # BLD: 0 /100 WBCS — SIGNIFICANT CHANGE UP (ref 0–0)
PH UR: 6.5 — SIGNIFICANT CHANGE UP (ref 5–8)
PLATELET # BLD AUTO: 216 K/UL — SIGNIFICANT CHANGE UP (ref 150–400)
POTASSIUM SERPL-MCNC: 3.6 MMOL/L — SIGNIFICANT CHANGE UP (ref 3.5–5.3)
POTASSIUM SERPL-SCNC: 3.6 MMOL/L — SIGNIFICANT CHANGE UP (ref 3.5–5.3)
PROT SERPL-MCNC: 6.5 G/DL — SIGNIFICANT CHANGE UP (ref 6–8.3)
PROT UR-MCNC: ABNORMAL MG/DL
PROTHROM AB SERPL-ACNC: 13.3 SEC — SIGNIFICANT CHANGE UP (ref 10.6–13.6)
RBC # BLD: 3.69 M/UL — LOW (ref 3.8–5.2)
RBC # FLD: 13.2 % — SIGNIFICANT CHANGE UP (ref 10.3–14.5)
RBC CASTS # UR COMP ASSIST: < 5 /HPF — SIGNIFICANT CHANGE UP
SODIUM SERPL-SCNC: 141 MMOL/L — SIGNIFICANT CHANGE UP (ref 135–145)
SP GR SPEC: 1.02 — SIGNIFICANT CHANGE UP (ref 1–1.03)
UROBILINOGEN FLD QL: 1 E.U./DL — SIGNIFICANT CHANGE UP
WBC # BLD: 8.64 K/UL — SIGNIFICANT CHANGE UP (ref 3.8–10.5)
WBC # FLD AUTO: 8.64 K/UL — SIGNIFICANT CHANGE UP (ref 3.8–10.5)
WBC UR QL: < 5 /HPF — SIGNIFICANT CHANGE UP

## 2021-08-23 PROCEDURE — 84702 CHORIONIC GONADOTROPIN TEST: CPT

## 2021-08-23 PROCEDURE — 80053 COMPREHEN METABOLIC PANEL: CPT

## 2021-08-23 PROCEDURE — 81001 URINALYSIS AUTO W/SCOPE: CPT

## 2021-08-23 PROCEDURE — 85610 PROTHROMBIN TIME: CPT

## 2021-08-23 PROCEDURE — 99285 EMERGENCY DEPT VISIT HI MDM: CPT

## 2021-08-23 PROCEDURE — 85025 COMPLETE CBC W/AUTO DIFF WBC: CPT

## 2021-08-23 PROCEDURE — 83690 ASSAY OF LIPASE: CPT

## 2021-08-23 PROCEDURE — 96361 HYDRATE IV INFUSION ADD-ON: CPT

## 2021-08-23 PROCEDURE — 96365 THER/PROPH/DIAG IV INF INIT: CPT | Mod: XU

## 2021-08-23 PROCEDURE — 99284 EMERGENCY DEPT VISIT MOD MDM: CPT | Mod: 25

## 2021-08-23 PROCEDURE — 96375 TX/PRO/DX INJ NEW DRUG ADDON: CPT

## 2021-08-23 PROCEDURE — 74177 CT ABD & PELVIS W/CONTRAST: CPT | Mod: 26,MA

## 2021-08-23 PROCEDURE — 85730 THROMBOPLASTIN TIME PARTIAL: CPT

## 2021-08-23 PROCEDURE — 36415 COLL VENOUS BLD VENIPUNCTURE: CPT

## 2021-08-23 PROCEDURE — 96367 TX/PROPH/DG ADDL SEQ IV INF: CPT

## 2021-08-23 PROCEDURE — 74177 CT ABD & PELVIS W/CONTRAST: CPT | Mod: MA

## 2021-08-23 RX ORDER — OXYCODONE AND ACETAMINOPHEN 5; 325 MG/1; MG/1
1 TABLET ORAL ONCE
Refills: 0 | Status: DISCONTINUED | OUTPATIENT
Start: 2021-08-23 | End: 2021-08-23

## 2021-08-23 RX ORDER — CEFTRIAXONE 500 MG/1
1000 INJECTION, POWDER, FOR SOLUTION INTRAMUSCULAR; INTRAVENOUS ONCE
Refills: 0 | Status: COMPLETED | OUTPATIENT
Start: 2021-08-23 | End: 2021-08-23

## 2021-08-23 RX ORDER — METRONIDAZOLE 500 MG
1 TABLET ORAL
Qty: 30 | Refills: 0
Start: 2021-08-23 | End: 2021-09-01

## 2021-08-23 RX ORDER — SODIUM CHLORIDE 9 MG/ML
1000 INJECTION INTRAMUSCULAR; INTRAVENOUS; SUBCUTANEOUS ONCE
Refills: 0 | Status: COMPLETED | OUTPATIENT
Start: 2021-08-23 | End: 2021-08-23

## 2021-08-23 RX ORDER — CEFPODOXIME PROXETIL 100 MG
1 TABLET ORAL
Qty: 20 | Refills: 0
Start: 2021-08-23 | End: 2021-09-01

## 2021-08-23 RX ORDER — METRONIDAZOLE 500 MG
500 TABLET ORAL ONCE
Refills: 0 | Status: COMPLETED | OUTPATIENT
Start: 2021-08-23 | End: 2021-08-23

## 2021-08-23 RX ORDER — ONDANSETRON 8 MG/1
4 TABLET, FILM COATED ORAL ONCE
Refills: 0 | Status: COMPLETED | OUTPATIENT
Start: 2021-08-23 | End: 2021-08-23

## 2021-08-23 RX ORDER — MORPHINE SULFATE 50 MG/1
4 CAPSULE, EXTENDED RELEASE ORAL ONCE
Refills: 0 | Status: DISCONTINUED | OUTPATIENT
Start: 2021-08-23 | End: 2021-08-23

## 2021-08-23 RX ORDER — IOHEXOL 300 MG/ML
30 INJECTION, SOLUTION INTRAVENOUS ONCE
Refills: 0 | Status: COMPLETED | OUTPATIENT
Start: 2021-08-23 | End: 2021-08-23

## 2021-08-23 RX ADMIN — CEFTRIAXONE 1000 MILLIGRAM(S): 500 INJECTION, POWDER, FOR SOLUTION INTRAMUSCULAR; INTRAVENOUS at 22:50

## 2021-08-23 RX ADMIN — Medication 500 MILLIGRAM(S): at 23:51

## 2021-08-23 RX ADMIN — MORPHINE SULFATE 4 MILLIGRAM(S): 50 CAPSULE, EXTENDED RELEASE ORAL at 19:55

## 2021-08-23 RX ADMIN — CEFTRIAXONE 100 MILLIGRAM(S): 500 INJECTION, POWDER, FOR SOLUTION INTRAMUSCULAR; INTRAVENOUS at 22:22

## 2021-08-23 RX ADMIN — IOHEXOL 30 MILLILITER(S): 300 INJECTION, SOLUTION INTRAVENOUS at 19:56

## 2021-08-23 RX ADMIN — MORPHINE SULFATE 4 MILLIGRAM(S): 50 CAPSULE, EXTENDED RELEASE ORAL at 22:22

## 2021-08-23 RX ADMIN — ONDANSETRON 4 MILLIGRAM(S): 8 TABLET, FILM COATED ORAL at 19:55

## 2021-08-23 RX ADMIN — SODIUM CHLORIDE 1000 MILLILITER(S): 9 INJECTION INTRAMUSCULAR; INTRAVENOUS; SUBCUTANEOUS at 19:53

## 2021-08-23 RX ADMIN — SODIUM CHLORIDE 1000 MILLILITER(S): 9 INJECTION INTRAMUSCULAR; INTRAVENOUS; SUBCUTANEOUS at 22:22

## 2021-08-23 RX ADMIN — OXYCODONE AND ACETAMINOPHEN 1 TABLET(S): 5; 325 TABLET ORAL at 23:52

## 2021-08-23 RX ADMIN — Medication 100 MILLIGRAM(S): at 22:50

## 2021-08-23 NOTE — ED PROVIDER NOTE - PATIENT PORTAL LINK FT
You can access the FollowMyHealth Patient Portal offered by [facility] by registering at the following website: [email or web address]. By joining [employer]’s FollowMyHealth portal, you will also be able to view your health information using other applications (apps) compatible with our system.

## 2021-08-23 NOTE — ED ADULT TRIAGE NOTE - CHIEF COMPLAINT QUOTE
x 2 days of LLQ abdominal pain. denies fevers, chills, n/v/d, hematuria, blood in stool, urinary complaints.

## 2021-08-23 NOTE — ED PROVIDER NOTE - PHYSICAL EXAMINATION
CONSTITUTIONAL: Well-appearing;  in no apparent distress.   HEAD: Normocephalic; atraumatic.   EYES: PERRL; EOM intact; conjunctiva and sclera clear  ENT: normal nose; no rhinorrhea; normal pharynx with no erythema or lesions.   NECK: Supple; non-tender; no LAD  CARDIOVASCULAR: Normal S1, S2; No audible murmurs. Regular rate and rhythm.   RESPIRATORY: Breathing easily; breath sounds clear and equal bilaterally; no wheezes, rhonchi, or rales.  GI: Soft; non-distended; +ttp LLQ   MSK: FROM at all extremities, normal tone   EXT: No cyanosis or edema; N/V intact  SKIN: Normal for age and race; warm; dry; good turgor; no apparent lesions or rash.   NEURO: A & O x 3; face symmetric; grossly unremarkable.   PSYCHOLOGICAL: The patient’s mood and manner are appropriate.

## 2021-08-23 NOTE — ED PROVIDER NOTE - CLINICAL SUMMARY MEDICAL DECISION MAKING FREE TEXT BOX
51 yo f with pmh of gastritis, low BP, psh of cholecystectomy and appendectomy c/o LLQ pain since yesterday. Reports nauea. Denies fever, chill, vomiting, diarrhea, dysuria, hematuria, vaginal bleeding or discharge. VSS. Abd soft, +ttp to LLQ r/o diverticulitis

## 2021-08-23 NOTE — ED ADULT NURSE NOTE - OBJECTIVE STATEMENT
51 y/o female presents to ED with c/o LLQ abdominal pain x2 days with associated nausea. Denies constipation/diarrhea/bloody stools, vomiting, urinary sx or fevers.

## 2021-08-23 NOTE — ED PROVIDER NOTE - PROGRESS NOTE DETAILS
Ct shows uncomplicated diverticulitis. Will dc with abx, return precautions discussed. Ct shows uncomplicated diverticulitis. Pain controlled. Tolerating po. Pt feels safe going home. Will dc with abx, return precautions discussed.

## 2021-08-23 NOTE — ED PROVIDER NOTE - OBJECTIVE STATEMENT
HISTORY OF PRESENT ILLNESS  Suma Villegas is a 24 y.o. female. HPI: Patient is accompanied by her father requesting to get MRI of her lower back. She had lower back injury and had seen many different spine specialist and neurosurgeons in the past, per her father. He reports her last MRI showed  Bulging disk. Currently she is under care of DR Tino Brunner / sport medicine at [facility]. She is getting physical therapy for 6 weeks then if her symptoms didn't improve., her DR will order another MRI. She describes lower back pain as dull, radiating to her right hip, worse with sitting, better with rest and walking. Rates pain 5/10. She is taking Neurontin 100 mg at night. But she doesn't  like to take any medicating. At first she was prescribed 300 mg but she has weaned herself down to 100 mg. She was rated high in depression scale. She has history of anxiety/depresion, and currently is seeing a therapist. She doesn't like to see a psychiatrist, stating that psychiatrist told her that she has bipolar and prescribed her medication that made her very tired-- doesn't know name of the medication--. She is willing to start on medication for depression/anxiety today. Past Medical History:   Diagnosis Date    Anxiety 7/27/2017    Migraines     migraines     Past Surgical History:   Procedure Laterality Date    DERMATOLOGICAL PROCEDURE  2002    purple lesion removed from lower back   No Known Allergies    Current Outpatient Prescriptions:     gabapentin (NEURONTIN) 100 mg capsule, Take 100 mg by mouth daily. Takes 1 cap daily. , Disp: , Rfl:     DULoxetine (CYMBALTA) 30 mg capsule, Take 1 Cap by mouth daily. , Disp: 30 Cap, Rfl: 1  Review of Systems   Constitutional: Negative. Respiratory: Negative. Cardiovascular: Negative. Gastrointestinal: Negative. Musculoskeletal: Positive for back pain. Psychiatric/Behavioral: Positive for depression. Negative for hallucinations, substance abuse and suicidal ideas.  The patient is nervous/anxious. The patient does not have insomnia. Blood pressure 104/72, pulse 90, temperature 98.5 °F (36.9 °C), temperature source Oral, resp. rate 16, height 5' 4\" (1.626 m), weight 153 lb 6.4 oz (69.6 kg), last menstrual period 06/08/2018, SpO2 96 %. Physical Exam   Constitutional: No distress. HENT:   Mouth/Throat: Oropharynx is clear and moist.   Neck: Normal range of motion. Neck supple. Cardiovascular: Normal rate and regular rhythm. No murmur heard. Pulmonary/Chest: Effort normal and breath sounds normal.   Abdominal: Soft. Bowel sounds are normal.   Musculoskeletal: She exhibits tenderness. She exhibits no edema or deformity. Psychiatric: She has a normal mood and affect. Her behavior is normal.   Nursing note and vitals reviewed. ASSESSMENT and PLAN  Diagnoses and all orders for this visit:    1. Depression, unspecified depression type  -     DULoxetine (CYMBALTA) 30 mg capsule; Take 1 Cap by mouth daily. Follow up in three weeks    2.  Chronic midline low back pain with sciatica, sciatica laterality unspecified       Follow up with back specialist   Pt was given an after visit summary which includes diagnosis, current medicines and vital and voiced understanding of treatment plan 49 yo f with pmh of gastritis, low BP, psh of cholecystectomy and appendectomy c/o LLQ pain since yesterday. Reports nauea. Denies fever, chill, vomiting, diarrhea, dysuria, hematuria, vaginal bleeding or discharge. LMP 8/15. 49 yo f with pmh of gastritis, low BP, psh of cholecystectomy and appendectomy c/o LLQ pain since yesterday. Reports nausea. Denies fever, chill, vomiting, diarrhea, dysuria, hematuria, vaginal bleeding or discharge. LMP 8/15.

## 2021-12-09 ENCOUNTER — EMERGENCY (EMERGENCY)
Facility: HOSPITAL | Age: 51
LOS: 1 days | Discharge: HOME | End: 2021-12-09
Attending: EMERGENCY MEDICINE | Admitting: EMERGENCY MEDICINE
Payer: MEDICAID

## 2021-12-09 VITALS
RESPIRATION RATE: 17 BRPM | WEIGHT: 134.92 LBS | HEIGHT: 62 IN | DIASTOLIC BLOOD PRESSURE: 79 MMHG | SYSTOLIC BLOOD PRESSURE: 122 MMHG | HEART RATE: 87 BPM | TEMPERATURE: 98 F | OXYGEN SATURATION: 98 %

## 2021-12-09 DIAGNOSIS — H57.13 OCULAR PAIN, BILATERAL: ICD-10-CM

## 2021-12-09 DIAGNOSIS — Z90.49 ACQUIRED ABSENCE OF OTHER SPECIFIED PARTS OF DIGESTIVE TRACT: Chronic | ICD-10-CM

## 2021-12-09 DIAGNOSIS — H57.89 OTHER SPECIFIED DISORDERS OF EYE AND ADNEXA: ICD-10-CM

## 2021-12-09 PROCEDURE — 99283 EMERGENCY DEPT VISIT LOW MDM: CPT

## 2021-12-09 NOTE — ED ADULT NURSE NOTE - OBJECTIVE STATEMENT
51y female c/o R eye burning. pt states, "I put peppermint oil on my forehead an hour ago because I had a headache and it got into my eye. now my eye burns and I cant open it without a lot of burning." denies PMH. pt states she does not know if her vision is okay because it hurts too much to open. redness noted. No acute distress noted at this time.

## 2021-12-10 VITALS
RESPIRATION RATE: 19 BRPM | HEART RATE: 82 BPM | TEMPERATURE: 98 F | SYSTOLIC BLOOD PRESSURE: 111 MMHG | DIASTOLIC BLOOD PRESSURE: 82 MMHG | OXYGEN SATURATION: 97 %

## 2021-12-10 RX ORDER — FLUORESCEIN SODIUM 9 MG
1 STRIP OPHTHALMIC (EYE) ONCE
Refills: 0 | Status: COMPLETED | OUTPATIENT
Start: 2021-12-10 | End: 2021-12-10

## 2021-12-10 RX ADMIN — Medication 1 DROP(S): at 00:49

## 2021-12-10 RX ADMIN — Medication 1 APPLICATION(S): at 00:49

## 2021-12-10 NOTE — ED PROVIDER NOTE - PHYSICAL EXAMINATION
mild conjunctival injection b/l  PERRL  no chemosis  EOMI  woods lamp, fluorescein - no abrasions  no FB  OS 20/20  OD 20/20

## 2021-12-10 NOTE — ED PROVIDER NOTE - NSFOLLOWUPINSTRUCTIONS_ED_ALL_ED_FT
Puede usar lágrimas artificiales nahed todo el día para bueno comodidad.      Conjuntivitis por sustancias químicas en los adultos    Chemical Conjunctivitis, Adult    La conjuntivitis por sustancias químicas es raul irritación e hinchazón (inflamación) en el tano. También se la conoce jonathan "tano mariscal". Esta afección ocurre cuando entra raul sustancia química en el tano. Esta afección hace que el tano se ponga de color mariscal o jozef, y le pique.    Puede presentarse en luz o en ambos ojos. No puede contagiarle esta afección a otra persona.    Siga estas indicaciones en bueno casa:    •[place] o aplíquese los medicamentos solamente jonathan se lo haya indicado el médico.    •Si le recetaron un antibiótico, tómelo o aplíquelo jonathan se lo haya indicado el médico. No deje de usar el medicamento aunque comience a sentirse mejor.    •Hasta que el tano vuelva a la normalidad:  •No use lentes de contacto. En bueno lugar, use anteojos.    •No use maquillaje de ojos.    •No se toque ni se frote los ojos.    •Coloque un paño limpio y frío (compresa) en el tano nahed un lapso de 10 a 20 minutos. Hágalo 3 o 4 veces al día.    •Evite estar cerca de la sustancia química o el ambiente que provocaron la irritación. Use protección ocular si lo necesita.    •Lávese las warren con agua y jabón antes de colocarse las gotas en los ojos o de ponerse un paño frío en los ojos. Use un desinfectante para warren si no dispone de agua y jabón.    Comuníquese con un médico si:    •Los síntomas no mejoran.    •Los síntomas empeoran.    •Aparecen nuevos síntomas.    •El dolor empeora.    •Tiene pus en el tano.    Solicite ayuda de inmediato si:    •Le empeora la visión repentinamente.    Resumen    •La conjuntivitis por sustancias químicas es raul irritación e hinchazón (inflamación) en el tano. También se la conoce jonathan "tano mariscal".    •Esta afección ocurre cuando entra raul sustancia química en el tano.    •[place] o aplíquese los medicamentos solamente jonathan se lo haya indicado el médico.    •Coloque un paño limpio y frío en el tano nahed un lapso de 10 a 20 minutos. Hágalo 3 o 4 veces al día.    •Hasta que el tano vuelva a la normalidad, no use lentes de contacto ni maquillaje de ojos; tampoco se toque ni se frote los ojos.      Esta información no tiene jonathan fin reemplazar el consejo del médico. Asegúrese de hacerle al médico cualquier pregunta que tenga.

## 2021-12-10 NOTE — ED PROVIDER NOTE - CLINICAL SUMMARY MEDICAL DECISION MAKING FREE TEXT BOX
b/l eye irritation, mild redness on exam, no visual changes, no APD, no FB, no abrasions. likely chemical irritation to eyes  -artifical tears as needed  can f/u with ophtho    I have discussed the discharge plan with the patient. The patient agrees with the plan, as discussed.  The patient understands Emergency Department diagnosis is a preliminary diagnosis often based on limited information and that the patient must adhere to the follow-up plan as discussed.  The patient understands that if the symptoms worsen or if prescribed medications do not have the desired/planned effect that the patient may return to the Emergency Department at any time for further evaluation and treatment.

## 2021-12-10 NOTE — ED PROVIDER NOTE - OBJECTIVE STATEMENT
51F no PMH c/o b/l eye pain and irritation. pt states she had a headache earlier tonight and had her daughter rub mint oil on her forehead. states she thinks some of it got in her eyes. c/o tearing, pain and sensation that something is stuck in her eyes.  eyes were flushed by RN and now pt states her eyes feeling much better. no contacts or glasses use. no discharge.

## 2021-12-10 NOTE — ED PROVIDER NOTE - NSFOLLOWUPCLINICS_GEN_ALL_ED_FT
[facility]  Ophthalmology  [address] [place] [address]  Phone: [phone]  Fax:

## 2021-12-10 NOTE — ED PROVIDER NOTE - PATIENT PORTAL LINK FT
You can access the FollowMyHealth Patient Portal offered by [facility] by registering at the following website: [email or web address]. By joining [employer]’s FollowMyHealth portal, you will also be able to view your health information using other applications (apps) compatible with our system.

## 2022-05-23 NOTE — H&P ADULT - ASSESSMENT
stated
48 y/o F pmh Gastritis, PSH Appendectomy p/w 1 day of abdominal pain. Afebrile HD stable. WBC 7.20, Tbili 0.9, ALk phos 136, Lipase 23. U/S reveals multiple stones filling the gallbladder lumen w/ + Tabor sign suspicious for cholecystitis dilated extrahepatic bile duct 1.3cm w/o sonographic evidence of choledocholithiasis. Presentation c/w acute cholecystitis vs choledocholithiasis vs stricture    Admit to Dr. Blount, [facility], Surgery Team 4  NPO/IVF  Zofran/Protonix  Minimal Narcotics  MRCP to elucidate source of duct dilation   Plan discussed with attending and chief resident

## 2022-08-26 NOTE — PATIENT PROFILE ADULT - NSPROSPHOSPCHAPLAINYN_GEN_A_NUR
[facility] [address], [address] [place] [address] [address]  Phone: [phone]    DISCHARGE INSTRUCTIONS:    1   _x__ Complete Exam - Normal    2   ___ Exam normal, but entire colon not seen  We will discuss this with you  3   ___ Polyp(s) removed by "burning" - NO pathology report will follow    4   ___ Polyp(s) removed by excision  Pathology report will be available in 4-5 days   Someone from our office will call you with results  5   ___ Exam prompted biopsies  Pathology report will be available in 4-5 days   Someone from our office will call you with results  6   ___ Exam demonstrated findings that need treatment  Prescriptions will be   Given to you  Return to our office in ____ weeks  Please call for appt  7   ___ Original office visit or colonoscopy findings necessitate an office visit  Please call to set up a new appointment    8   ___ Medication  __________________________________________        [address]:    - Go straight home and rest today    - No driving or drinking alcohol for 24 hours    - Resume regular diet and medications unless otherwise instructed  Coumadin and Plavix are blood thinners  You can resume these medications on __________      IF YOU ARE HAVING ANY FEVER, BLEEDING OR PERSISTENT PAIN IN THE ABDOMEN, PLEASE CALL OUR OFFICE ANY DAY OR TIME  [phone] no

## 2023-03-20 NOTE — ED ADULT NURSE NOTE - CAS EDN DISCHARGE ASSESSMENT
What Type Of Note Output Would You Prefer (Optional)?: Bullet Format
Hpi Title: Evaluation of Skin Lesions
Alert and oriented to person, place and time

## 2024-02-01 NOTE — PATIENT PROFILE ADULT - PUBLIC BENEFITS
[facility] PHYSICIANS [facility] ORTHOPEDICS AND SPORTS MEDICINE  [address]  [place] [id number]  Dept: [phone]    Ambulatory Orthopedic Consult      CHIEF COMPLAINT:    Chief Complaint   Patient presents with    Ankle Pain     Right       HISTORY OF PRESENT ILLNESS:      The patient is a 41 y.o. female who is being seen for evaluation of the above, which began 11/15/2023 while running on a treadmill (denies any specific injury or fall). At today's visit, she is using no brace/assistive device.     History is obtained today from:   [x]  the patient     [x]  EMR     []  one family member/friend    []  multiple family members/friends    []  other:      At today's visit, the patient localizes pain to the right dorsal foot/ankle junction.    REVIEW OF SYSTEMS:  Musculoskeletal: See HPI for pertinent positives     Past Medical History:    She  has a past medical history of BRCA1 negative, Cervical pain (neck), Headache, and Hypertension.     Past Surgical History:    She  has a past surgical history that includes Endometrial biopsy;  section; Tonsillectomy; Cholecystectomy (); and Dilation and curettage of uterus ().     Current Medications:     Current Outpatient Medications:     phentermine (ADIPEX-P) 37.5 MG tablet, Take 1 tablet by mouth every morning (before breakfast) for 30 days. Max Daily Amount: 37.5 mg, Disp: 30 tablet, Rfl: 0    LORazepam (ATIVAN) 0.5 MG tablet, Take 1 tablet by mouth daily as needed (anxiety) for up to 30 days. Max Daily Amount: 0.5 mg, Disp: 30 tablet, Rfl: 0    baclofen (LIORESAL) 10 MG tablet, Take 1 tablet by mouth nightly, Disp: 30 tablet, Rfl: 2    diclofenac (VOLTAREN) 50 MG EC tablet, Take 1 tablet by mouth 3 times daily (with meals), Disp: 60 tablet, Rfl: 3    medroxyPROGESTERone (DEPO-PROVERA) 150 MG/ML injection, use as directed at physician's office every 3 months, Disp: , Rfl:     ondansetron 
no

## 2024-02-08 ENCOUNTER — EMERGENCY (EMERGENCY)
Facility: HOSPITAL | Age: 54
LOS: 1 days | Discharge: HOME | End: 2024-02-08
Attending: EMERGENCY MEDICINE | Admitting: EMERGENCY MEDICINE
Payer: MEDICAID

## 2024-02-08 VITALS
DIASTOLIC BLOOD PRESSURE: 59 MMHG | RESPIRATION RATE: 16 BRPM | OXYGEN SATURATION: 98 % | SYSTOLIC BLOOD PRESSURE: 102 MMHG | WEIGHT: 141.98 LBS | HEART RATE: 75 BPM | TEMPERATURE: 98 F

## 2024-02-08 VITALS
DIASTOLIC BLOOD PRESSURE: 62 MMHG | HEART RATE: 60 BPM | OXYGEN SATURATION: 97 % | RESPIRATION RATE: 16 BRPM | SYSTOLIC BLOOD PRESSURE: 95 MMHG

## 2024-02-08 DIAGNOSIS — Z90.49 ACQUIRED ABSENCE OF OTHER SPECIFIED PARTS OF DIGESTIVE TRACT: ICD-10-CM

## 2024-02-08 DIAGNOSIS — N83.202 UNSPECIFIED OVARIAN CYST, LEFT SIDE: ICD-10-CM

## 2024-02-08 DIAGNOSIS — Z90.49 ACQUIRED ABSENCE OF OTHER SPECIFIED PARTS OF DIGESTIVE TRACT: Chronic | ICD-10-CM

## 2024-02-08 DIAGNOSIS — R10.32 LEFT LOWER QUADRANT PAIN: ICD-10-CM

## 2024-02-08 LAB
ALBUMIN SERPL ELPH-MCNC: 3.9 G/DL — SIGNIFICANT CHANGE UP (ref 3.3–5)
ALP SERPL-CCNC: 128 U/L — HIGH (ref 40–120)
ALT FLD-CCNC: 15 U/L — SIGNIFICANT CHANGE UP (ref 10–45)
ANION GAP SERPL CALC-SCNC: 7 MMOL/L — SIGNIFICANT CHANGE UP (ref 5–17)
APPEARANCE UR: CLEAR — SIGNIFICANT CHANGE UP
AST SERPL-CCNC: 20 U/L — SIGNIFICANT CHANGE UP (ref 10–40)
BASOPHILS # BLD AUTO: 0.02 K/UL — SIGNIFICANT CHANGE UP (ref 0–0.2)
BASOPHILS NFR BLD AUTO: 0.4 % — SIGNIFICANT CHANGE UP (ref 0–2)
BILIRUB SERPL-MCNC: 0.2 MG/DL — SIGNIFICANT CHANGE UP (ref 0.2–1.2)
BILIRUB UR-MCNC: NEGATIVE — SIGNIFICANT CHANGE UP
BUN SERPL-MCNC: 15 MG/DL — SIGNIFICANT CHANGE UP (ref 7–23)
CALCIUM SERPL-MCNC: 8.5 MG/DL — SIGNIFICANT CHANGE UP (ref 8.4–10.5)
CHLORIDE SERPL-SCNC: 106 MMOL/L — SIGNIFICANT CHANGE UP (ref 96–108)
CO2 SERPL-SCNC: 26 MMOL/L — SIGNIFICANT CHANGE UP (ref 22–31)
COLOR SPEC: YELLOW — SIGNIFICANT CHANGE UP
CREAT SERPL-MCNC: 0.68 MG/DL — SIGNIFICANT CHANGE UP (ref 0.5–1.3)
DIFF PNL FLD: NEGATIVE — SIGNIFICANT CHANGE UP
EGFR: 105 ML/MIN/1.73M2 — SIGNIFICANT CHANGE UP
EOSINOPHIL # BLD AUTO: 0.1 K/UL — SIGNIFICANT CHANGE UP (ref 0–0.5)
EOSINOPHIL NFR BLD AUTO: 2 % — SIGNIFICANT CHANGE UP (ref 0–6)
GLUCOSE SERPL-MCNC: 95 MG/DL — SIGNIFICANT CHANGE UP (ref 70–99)
GLUCOSE UR QL: NEGATIVE MG/DL — SIGNIFICANT CHANGE UP
HCT VFR BLD CALC: 39.1 % — SIGNIFICANT CHANGE UP (ref 34.5–45)
HGB BLD-MCNC: 12.7 G/DL — SIGNIFICANT CHANGE UP (ref 11.5–15.5)
IMM GRANULOCYTES NFR BLD AUTO: 0.2 % — SIGNIFICANT CHANGE UP (ref 0–0.9)
KETONES UR-MCNC: NEGATIVE MG/DL — SIGNIFICANT CHANGE UP
LEUKOCYTE ESTERASE UR-ACNC: NEGATIVE — SIGNIFICANT CHANGE UP
LYMPHOCYTES # BLD AUTO: 1.43 K/UL — SIGNIFICANT CHANGE UP (ref 1–3.3)
LYMPHOCYTES # BLD AUTO: 28 % — SIGNIFICANT CHANGE UP (ref 13–44)
MCHC RBC-ENTMCNC: 30 PG — SIGNIFICANT CHANGE UP (ref 27–34)
MCHC RBC-ENTMCNC: 32.5 GM/DL — SIGNIFICANT CHANGE UP (ref 32–36)
MCV RBC AUTO: 92.2 FL — SIGNIFICANT CHANGE UP (ref 80–100)
MONOCYTES # BLD AUTO: 0.52 K/UL — SIGNIFICANT CHANGE UP (ref 0–0.9)
MONOCYTES NFR BLD AUTO: 10.2 % — SIGNIFICANT CHANGE UP (ref 2–14)
NEUTROPHILS # BLD AUTO: 3.03 K/UL — SIGNIFICANT CHANGE UP (ref 1.8–7.4)
NEUTROPHILS NFR BLD AUTO: 59.2 % — SIGNIFICANT CHANGE UP (ref 43–77)
NITRITE UR-MCNC: NEGATIVE — SIGNIFICANT CHANGE UP
NRBC # BLD: 0 /100 WBCS — SIGNIFICANT CHANGE UP (ref 0–0)
PH UR: 6 — SIGNIFICANT CHANGE UP (ref 5–8)
PLATELET # BLD AUTO: 223 K/UL — SIGNIFICANT CHANGE UP (ref 150–400)
POTASSIUM SERPL-MCNC: 3.7 MMOL/L — SIGNIFICANT CHANGE UP (ref 3.5–5.3)
POTASSIUM SERPL-SCNC: 3.7 MMOL/L — SIGNIFICANT CHANGE UP (ref 3.5–5.3)
PROT SERPL-MCNC: 6.7 G/DL — SIGNIFICANT CHANGE UP (ref 6–8.3)
PROT UR-MCNC: NEGATIVE MG/DL — SIGNIFICANT CHANGE UP
RBC # BLD: 4.24 M/UL — SIGNIFICANT CHANGE UP (ref 3.8–5.2)
RBC # FLD: 13.1 % — SIGNIFICANT CHANGE UP (ref 10.3–14.5)
SODIUM SERPL-SCNC: 139 MMOL/L — SIGNIFICANT CHANGE UP (ref 135–145)
SP GR SPEC: 1.02 — SIGNIFICANT CHANGE UP (ref 1–1.03)
UROBILINOGEN FLD QL: 1 MG/DL — SIGNIFICANT CHANGE UP (ref 0.2–1)
WBC # BLD: 5.11 K/UL — SIGNIFICANT CHANGE UP (ref 3.8–10.5)
WBC # FLD AUTO: 5.11 K/UL — SIGNIFICANT CHANGE UP (ref 3.8–10.5)

## 2024-02-08 PROCEDURE — G1004: CPT

## 2024-02-08 PROCEDURE — 74177 CT ABD & PELVIS W/CONTRAST: CPT | Mod: 26,MG

## 2024-02-08 PROCEDURE — 74177 CT ABD & PELVIS W/CONTRAST: CPT | Mod: MG

## 2024-02-08 PROCEDURE — 85025 COMPLETE CBC W/AUTO DIFF WBC: CPT

## 2024-02-08 PROCEDURE — 36415 COLL VENOUS BLD VENIPUNCTURE: CPT

## 2024-02-08 PROCEDURE — 81003 URINALYSIS AUTO W/O SCOPE: CPT

## 2024-02-08 PROCEDURE — 80053 COMPREHEN METABOLIC PANEL: CPT

## 2024-02-08 PROCEDURE — 96375 TX/PRO/DX INJ NEW DRUG ADDON: CPT

## 2024-02-08 PROCEDURE — 99285 EMERGENCY DEPT VISIT HI MDM: CPT

## 2024-02-08 PROCEDURE — 96374 THER/PROPH/DIAG INJ IV PUSH: CPT | Mod: XU

## 2024-02-08 PROCEDURE — 99284 EMERGENCY DEPT VISIT MOD MDM: CPT | Mod: 25

## 2024-02-08 RX ORDER — ONDANSETRON 8 MG/1
4 TABLET, FILM COATED ORAL ONCE
Refills: 0 | Status: COMPLETED | OUTPATIENT
Start: 2024-02-08 | End: 2024-02-08

## 2024-02-08 RX ORDER — KETOROLAC TROMETHAMINE 30 MG/ML
15 SYRINGE (ML) INJECTION ONCE
Refills: 0 | Status: DISCONTINUED | OUTPATIENT
Start: 2024-02-08 | End: 2024-02-08

## 2024-02-08 RX ORDER — IOHEXOL 300 MG/ML
30 INJECTION, SOLUTION INTRAVENOUS ONCE
Refills: 0 | Status: COMPLETED | OUTPATIENT
Start: 2024-02-08 | End: 2024-02-08

## 2024-02-08 RX ORDER — SODIUM CHLORIDE 9 MG/ML
1000 INJECTION INTRAMUSCULAR; INTRAVENOUS; SUBCUTANEOUS ONCE
Refills: 0 | Status: COMPLETED | OUTPATIENT
Start: 2024-02-08 | End: 2024-02-08

## 2024-02-08 RX ADMIN — IOHEXOL 30 MILLILITER(S): 300 INJECTION, SOLUTION INTRAVENOUS at 18:18

## 2024-02-08 RX ADMIN — Medication 15 MILLIGRAM(S): at 18:19

## 2024-02-08 RX ADMIN — SODIUM CHLORIDE 1000 MILLILITER(S): 9 INJECTION INTRAMUSCULAR; INTRAVENOUS; SUBCUTANEOUS at 18:18

## 2024-02-08 RX ADMIN — ONDANSETRON 4 MILLIGRAM(S): 8 TABLET, FILM COATED ORAL at 18:18

## 2024-02-08 NOTE — ED PROVIDER NOTE - CLINICAL SUMMARY MEDICAL DECISION MAKING FREE TEXT BOX
54 y/o f presents c/o LLQ abd pain x 1 week.  Pt afebrile in ED, plan for labs, CT a/p r/o diverticulitis.  Will f/u results and reassess, given toradol for pain.

## 2024-02-08 NOTE — ED PROVIDER NOTE - NS ED ATTENDING STATEMENT MOD
I have seen and examined this patient and fully participated in the care of this patient as the teaching attending.  The service was shared with the LORENZO.  I reviewed and verified the documentation.

## 2024-02-08 NOTE — ED ADULT TRIAGE NOTE - CHIEF COMPLAINT QUOTE
Pt states "I have pain on my left side for 5 days." Left sided abd pain x 5 days with nausea. No vomiting/fever.

## 2024-02-08 NOTE — ED PROVIDER NOTE - OBJECTIVE STATEMENT
54 y/o f no significant pmh, s/p cholecystectomy and appendectomy presents c/o left lower abd pain radiating to her back for the past week.  Pt stating she has felt nauseous but hasn't been vomiting, no change in stools.  Pt took ibuprofen yesterday with improvement but pain returned.  Denies fever, chills, dysuria, diarrhea, CP, SOB, all other ROS negative.

## 2024-02-08 NOTE — ED ADULT NURSE NOTE - OBJECTIVE STATEMENT
Pt received aaox3 c/o left sided flank pain for 5x days with nausea. Pt denies any vomiting, diarrhea, fever, chills, CP or SOB.

## 2024-02-08 NOTE — ED ADULT NURSE NOTE - NSFALLRISKFACTORS_ED_ALL_ED
From: Hector Rodriguez  To: Hector Valerio  Sent: 6/24/2021 2:52 PM CDT  Subject: Medication Question    Please make sure the refills are 90 days for each prescription. I have a zero copay for 90 day refills. Thank you for your help.   No indicators present

## 2024-02-08 NOTE — ED PROVIDER NOTE - ATTENDING CONTRIBUTION TO CARE
54 yo no pmh, s/p appendectomy, cholecystectomy, w llq pain radiating to  back, Denies f/c, NVD, black/bloody stool, urinary complaints, focal weakness/numbness, lightheadedness, back pain,  SOB/CP, URI symptoms. Normal PO intake, normal BMs. Well appearing, nad, nc/at, lung cta, heart reg, abd soft, nt, ext no gross deformity, no gross neuro deficits, ttp in llq, ddx colitis/ diverticulitis, pending labs, CT a/p, reassess.

## 2024-02-08 NOTE — ED PROVIDER NOTE - PROGRESS NOTE DETAILS
CT shows left ovarian cyst, possibly hemorrhagic.  Pain resolved with toradol, will dc, to f/u with gyn, continue ibuprofen PRN pain, return to ED if sx worsen.

## 2024-02-08 NOTE — ED PROVIDER NOTE - NSFOLLOWUPINSTRUCTIONS_ED_ALL_ED_FT
Please follow up with gyn, you can call to make an appointment    [facility]  [address] [facility]  [phone]    Quiste ovárico  Ovarian Cyst    Un quiste ovárico es raul bolsa llena de líquido que se forma en el ovario. En bueno mayoría, estos quistes desaparecen solos y no son cancerosos. Otros quistes necesitan tratamiento.    ¿Cuáles son las causas?  Síndrome de hiperestimulación ovárica. Algunos medicamentos pueden causar deshaun problema.  Síndrome del ovario poliquístico (SOP). Problemas con las sustancias químicas del cuerpo (hormonas) pueden causar esta afección.  El ciclo menstrual normal.  ¿Qué incrementa el riesgo?  Tener sobrepeso u obesidad.  Delta medicamentos para aumentar la probabilidad de quedar embarazada.  Usar algunos tipos de métodos anticonceptivos.  Fumar.  ¿Cuáles son los signos o síntomas?  Muchos quistes ováricos no causan síntomas. Si tiene síntomas, pueden incluir lo siguiente:  Dolor o presión en la mike entre los huesos de la cadera.  Dolor en el abdomen bajo.  Dolor al tener sexo.  Hinchazón en el abdomen bajo.  Períodos menstruales irregulares.  Dolor nahed los períodos menstruales.  ¿Cómo se trata?  Muchos de los quistes ováricos desaparecen por sí solos, sin tratamiento. Si necesita tratamiento, deshaun puede incluir lo siguiente:  Analgésicos.  Extracción del líquido del quiste.  Extirpación del quiste.  Píldoras anticonceptivas u otros medicamentos.  Cirugía para extirpar el ovario.  Siga estas instrucciones en bueno casa:  Use los medicamentos de venta atul y los recetados solamente jonathan se lo haya indicado el médico.  Pregunte al médico si debe evitar conducir o utilizar máquinas mientras agnes los medicamentos.  Asegúrese de realizarse exámenes y la prueba de Papanicolaou jonathan se lo haya indicado el médico.  Regrese a phoebe actividades normales cuando el médico le diga que es seguro.  No fume ni consuma ningún producto que contenga nicotina o tabaco. Si necesita ayuda para dejar de consumir estos productos, consulte al médico.  Cumpla con todas las visitas de seguimiento.  Comuníquese con un médico si:  Los períodos menstruales:  Se retrasan.  No son regulares.  Cesan.  Son dolorosos.  Siente dolor en la mike entre los huesos de la cadera, y el dolor no se va.  Siente presión en la vejiga.  Tiene dificultad para orinar.  Se siente satisfecha, o el abdomen le duele, se inflama o se hincha.  Sube o baja de peso sin proponérselo o tiene menos apetito de lo normal.  Siente dolor y opresión en la espalda.  Siente dolor y presión en la mike entre los huesos de la cadera.  Nyasia que puede estar embarazada.  Solicite ayuda de inmediato si:  Tiene un dolor muy intenso en el abdomen que empeora.  Siente dolor en la mike entre los huesos de la cadera, y el dolor es muy intenso o empeora.  No puede comer ni beber sin vomitar.  Tiene fiebre o escalofríos repentinamente.  Los períodos menstruales son más abundantes de lo habitual.  Resumen  Un quiste ovárico es raul bolsa llena de líquido que se forma en el ovario.  Algunos quistes pueden causar problemas y necesitar tratamiento.  En bueno mayoría, suelen desaparecer solos.  Esta información no tiene jonathan fin reemplazar el consejo del médico. Asegúrese de hacerle al médico cualquier pregunta que tenga.

## 2024-02-08 NOTE — ED ADULT NURSE NOTE - NSFALLUNIVINTERV_ED_ALL_ED
Bed/Stretcher in lowest position, wheels locked, appropriate side rails in place/Call bell, personal items and telephone in reach/Instruct patient to call for assistance before getting out of bed/chair/stretcher/Non-slip footwear applied when patient is off stretcher/[place] to call system/Physically safe environment - no spills, clutter or unnecessary equipment/Purposeful proactive rounding/Room/bathroom lighting operational, light cord in reach

## 2024-02-08 NOTE — ED PROVIDER NOTE - PATIENT PORTAL LINK FT
You can access the FollowMyHealth Patient Portal offered by [facility] by registering at the following website: [email or web address]. By joining [employer]’s FollowMyHealth portal, you will also be able to view your health information using other applications (apps) compatible with our system.

## 2024-09-14 NOTE — ED PROVIDER NOTE - RESPIRATORY, MLM
Shift Goals  Clinical Goals: Patient pain will be <4 throughout shift  Patient Goals: pain free  Family Goals: ELIANA    Patient a/o x4. Pain 9/10. Meds given. Patient ambulated to bathroom. Transported to d/c lounge.     Progress made toward(s) clinical / shift goals:    Problem: Knowledge Deficit - Standard  Goal: Patient and family/care givers will demonstrate understanding of plan of care, disease process/condition, diagnostic tests and medications  Outcome: Met     Problem: Pain - Standard  Goal: Alleviation of pain or a reduction in pain to the patient’s comfort goal  Outcome: Met       Patient is not progressing towards the following goals:      
The patient is Watcher - Medium risk of patient condition declining or worsening    Shift Goals  Clinical Goals: By 1am, patient will report tolerable level of pain 4/10.  Patient Goals: pain mgt.  Family Goals: none present    Progress made toward(s) clinical / shift goals: Patient is complaining of severe, constant, cramping abdominal pain 8-9/10, Oxycodone tab. PO and Dilaudid IVP given (see MAR). Placed patient comfortably on bed and comfort measures provided.     Patient is not progressing towards the following goals: N/A      
Breath sounds clear and equal bilaterally.